# Patient Record
Sex: FEMALE | Race: BLACK OR AFRICAN AMERICAN | Employment: UNEMPLOYED | URBAN - METROPOLITAN AREA
[De-identification: names, ages, dates, MRNs, and addresses within clinical notes are randomized per-mention and may not be internally consistent; named-entity substitution may affect disease eponyms.]

---

## 2017-04-15 ENCOUNTER — HOSPITAL ENCOUNTER (INPATIENT)
Age: 62
LOS: 5 days | Discharge: HOME | DRG: 291 | End: 2017-04-20
Attending: EMERGENCY MEDICINE | Admitting: INTERNAL MEDICINE
Payer: MEDICARE

## 2017-04-15 ENCOUNTER — APPOINTMENT (OUTPATIENT)
Dept: GENERAL RADIOLOGY | Age: 62
DRG: 291 | End: 2017-04-15
Attending: EMERGENCY MEDICINE
Payer: MEDICARE

## 2017-04-15 DIAGNOSIS — I47.20 V-TACH: ICD-10-CM

## 2017-04-15 DIAGNOSIS — R06.03 RESPIRATORY DISTRESS: Primary | ICD-10-CM

## 2017-04-15 DIAGNOSIS — J81.0 ACUTE PULMONARY EDEMA (HCC): ICD-10-CM

## 2017-04-15 PROBLEM — J44.9 COPD (CHRONIC OBSTRUCTIVE PULMONARY DISEASE) (HCC): Status: ACTIVE | Noted: 2017-04-15

## 2017-04-15 PROBLEM — T46.2X1A: Status: ACTIVE | Noted: 2017-04-15

## 2017-04-15 PROBLEM — Z86.79 HISTORY OF AORTIC DISSECTION: Status: ACTIVE | Noted: 2017-04-15

## 2017-04-15 PROBLEM — R79.89 ELEVATED BRAIN NATRIURETIC PEPTIDE (BNP) LEVEL: Status: ACTIVE | Noted: 2017-04-15

## 2017-04-15 PROBLEM — E78.5 HLD (HYPERLIPIDEMIA): Status: ACTIVE | Noted: 2017-04-15

## 2017-04-15 LAB
ALBUMIN SERPL BCP-MCNC: 3.2 G/DL (ref 3.4–5)
ALBUMIN/GLOB SERPL: 0.8 {RATIO} (ref 0.8–1.7)
ALP SERPL-CCNC: 113 U/L (ref 45–117)
ALT SERPL-CCNC: 62 U/L (ref 13–56)
ANION GAP BLD CALC-SCNC: 17 MMOL/L (ref 3–18)
APPEARANCE UR: CLEAR
APTT PPP: 35.1 SEC (ref 23–36.4)
ARTERIAL PATENCY WRIST A: ABNORMAL
ARTERIAL PATENCY WRIST A: ABNORMAL
AST SERPL W P-5'-P-CCNC: 57 U/L (ref 15–37)
BACTERIA URNS QL MICRO: ABNORMAL /HPF
BASE DEFICIT BLD-SCNC: 13 MMOL/L
BASE DEFICIT BLD-SCNC: 6 MMOL/L
BASOPHILS # BLD AUTO: 0 K/UL (ref 0–0.1)
BASOPHILS # BLD: 0 % (ref 0–3)
BDY SITE: ABNORMAL
BDY SITE: ABNORMAL
BILIRUB SERPL-MCNC: 0.3 MG/DL (ref 0.2–1)
BILIRUB UR QL: NEGATIVE
BNP SERPL-MCNC: 3450 PG/ML (ref 0–900)
BODY TEMPERATURE: 37
BODY TEMPERATURE: 97.9
BUN SERPL-MCNC: 20 MG/DL (ref 7–18)
BUN/CREAT SERPL: 9 (ref 12–20)
CALCIUM SERPL-MCNC: 8.7 MG/DL (ref 8.5–10.1)
CHLORIDE SERPL-SCNC: 109 MMOL/L (ref 100–108)
CK MB CFR SERPL CALC: 0.9 % (ref 0–4)
CK MB CFR SERPL CALC: 2.8 % (ref 0–4)
CK MB SERPL-MCNC: 1.5 NG/ML (ref 5–25)
CK MB SERPL-MCNC: 5.6 NG/ML (ref 5–25)
CK SERPL-CCNC: 174 U/L (ref 26–192)
CK SERPL-CCNC: 201 U/L (ref 26–192)
CO2 SERPL-SCNC: 18 MMOL/L (ref 21–32)
COLOR UR: YELLOW
CREAT SERPL-MCNC: 2.3 MG/DL (ref 0.6–1.3)
DIFFERENTIAL METHOD BLD: ABNORMAL
EOSINOPHIL # BLD: 0.1 K/UL (ref 0–0.4)
EOSINOPHIL NFR BLD: 1 % (ref 0–5)
EPITH CASTS URNS QL MICRO: ABNORMAL /LPF (ref 0–5)
ERYTHROCYTE [DISTWIDTH] IN BLOOD BY AUTOMATED COUNT: 16.1 % (ref 11.6–14.5)
GAS FLOW.O2 O2 DELIVERY SYS: ABNORMAL L/MIN
GAS FLOW.O2 O2 DELIVERY SYS: ABNORMAL L/MIN
GLOBULIN SER CALC-MCNC: 4 G/DL (ref 2–4)
GLUCOSE BLD STRIP.AUTO-MCNC: 107 MG/DL (ref 70–110)
GLUCOSE SERPL-MCNC: 291 MG/DL (ref 74–99)
GLUCOSE UR STRIP.AUTO-MCNC: NEGATIVE MG/DL
HCO3 BLD-SCNC: 15.7 MMOL/L (ref 22–26)
HCO3 BLD-SCNC: 20 MMOL/L (ref 22–26)
HCT VFR BLD AUTO: 39.7 % (ref 35–45)
HGB BLD-MCNC: 12.8 G/DL (ref 12–16)
HGB UR QL STRIP: ABNORMAL
INR PPP: 2.4 (ref 0.8–1.2)
KETONES UR QL STRIP.AUTO: NEGATIVE MG/DL
LEUKOCYTE ESTERASE UR QL STRIP.AUTO: NEGATIVE
LYMPHOCYTES # BLD AUTO: 48 % (ref 20–51)
LYMPHOCYTES # BLD: 5 K/UL (ref 0.8–3.5)
MAGNESIUM SERPL-MCNC: 2.2 MG/DL (ref 1.6–2.6)
MCH RBC QN AUTO: 29.4 PG (ref 24–34)
MCHC RBC AUTO-ENTMCNC: 32.2 G/DL (ref 31–37)
MCV RBC AUTO: 91.3 FL (ref 74–97)
MONOCYTES # BLD: 0.8 K/UL (ref 0–1)
MONOCYTES NFR BLD AUTO: 8 % (ref 2–9)
NEUTS SEG # BLD: 4.4 K/UL (ref 1.8–8)
NEUTS SEG NFR BLD AUTO: 43 % (ref 42–75)
NITRITE UR QL STRIP.AUTO: NEGATIVE
O2/TOTAL GAS SETTING VFR VENT: 0.7 %
O2/TOTAL GAS SETTING VFR VENT: 1 %
PCO2 BLD: 37.3 MMHG (ref 35–45)
PCO2 BLD: 43.1 MMHG (ref 35–45)
PEEP RESPIRATORY: 5 CMH2O
PEEP RESPIRATORY: 5 CMH2O
PH BLD: 7.17 [PH] (ref 7.35–7.45)
PH BLD: 7.33 [PH] (ref 7.35–7.45)
PH UR STRIP: 5.5 [PH] (ref 5–8)
PIP ISTAT,IPIP: 12
PIP ISTAT,IPIP: 12
PLATELET # BLD AUTO: 213 K/UL (ref 135–420)
PMV BLD AUTO: 11.6 FL (ref 9.2–11.8)
PO2 BLD: 281 MMHG (ref 80–100)
PO2 BLD: 66 MMHG (ref 80–100)
POTASSIUM SERPL-SCNC: 4.9 MMOL/L (ref 3.5–5.5)
PRESSURE SUPPORT SETTING VENT: 7 CMH2O
PRESSURE SUPPORT SETTING VENT: 7 CMH2O
PROT SERPL-MCNC: 7.2 G/DL (ref 6.4–8.2)
PROT UR STRIP-MCNC: 100 MG/DL
PROTHROMBIN TIME: 25.2 SEC (ref 11.5–15.2)
RBC # BLD AUTO: 4.35 M/UL (ref 4.2–5.3)
RBC #/AREA URNS HPF: ABNORMAL /HPF (ref 0–5)
RBC MORPH BLD: ABNORMAL
RBC MORPH BLD: ABNORMAL
SAO2 % BLD: 100 % (ref 92–97)
SAO2 % BLD: 92 % (ref 92–97)
SERVICE CMNT-IMP: ABNORMAL
SERVICE CMNT-IMP: ABNORMAL
SODIUM SERPL-SCNC: 144 MMOL/L (ref 136–145)
SP GR UR REFRACTOMETRY: 1.01 (ref 1–1.03)
SPECIMEN TYPE: ABNORMAL
SPECIMEN TYPE: ABNORMAL
SPONTANEOUS TIMED, IST: YES
SPONTANEOUS TIMED, IST: YES
TOTAL RESP. RATE, ITRR: 17
TOTAL RESP. RATE, ITRR: 30
TROPONIN I SERPL-MCNC: 0.05 NG/ML (ref 0–0.06)
TROPONIN I SERPL-MCNC: 0.98 NG/ML (ref 0–0.06)
TSH SERPL DL<=0.05 MIU/L-ACNC: 1.17 UIU/ML (ref 0.36–3.74)
UROBILINOGEN UR QL STRIP.AUTO: 0.2 EU/DL (ref 0.2–1)
WBC # BLD AUTO: 10.3 K/UL (ref 4.6–13.2)
WBC MORPH BLD: ABNORMAL
WBC URNS QL MICRO: ABNORMAL /HPF (ref 0–5)

## 2017-04-15 PROCEDURE — 82803 BLOOD GASES ANY COMBINATION: CPT

## 2017-04-15 PROCEDURE — 74011250636 HC RX REV CODE- 250/636

## 2017-04-15 PROCEDURE — 82962 GLUCOSE BLOOD TEST: CPT

## 2017-04-15 PROCEDURE — 80053 COMPREHEN METABOLIC PANEL: CPT | Performed by: EMERGENCY MEDICINE

## 2017-04-15 PROCEDURE — 93306 TTE W/DOPPLER COMPLETE: CPT

## 2017-04-15 PROCEDURE — 96375 TX/PRO/DX INJ NEW DRUG ADDON: CPT

## 2017-04-15 PROCEDURE — 85730 THROMBOPLASTIN TIME PARTIAL: CPT | Performed by: EMERGENCY MEDICINE

## 2017-04-15 PROCEDURE — 74011250636 HC RX REV CODE- 250/636: Performed by: EMERGENCY MEDICINE

## 2017-04-15 PROCEDURE — 05HP33Z INSERTION OF INFUSION DEVICE INTO RIGHT EXTERNAL JUGULAR VEIN, PERCUTANEOUS APPROACH: ICD-10-PCS | Performed by: EMERGENCY MEDICINE

## 2017-04-15 PROCEDURE — 99285 EMERGENCY DEPT VISIT HI MDM: CPT

## 2017-04-15 PROCEDURE — 93005 ELECTROCARDIOGRAM TRACING: CPT

## 2017-04-15 PROCEDURE — 36415 COLL VENOUS BLD VENIPUNCTURE: CPT | Performed by: INTERNAL MEDICINE

## 2017-04-15 PROCEDURE — 85025 COMPLETE CBC W/AUTO DIFF WBC: CPT | Performed by: EMERGENCY MEDICINE

## 2017-04-15 PROCEDURE — 94660 CPAP INITIATION&MGMT: CPT

## 2017-04-15 PROCEDURE — 84443 ASSAY THYROID STIM HORMONE: CPT | Performed by: INTERNAL MEDICINE

## 2017-04-15 PROCEDURE — 92960 CARDIOVERSION ELECTRIC EXT: CPT

## 2017-04-15 PROCEDURE — 77030035694 HC MSK BIPAP FLL FAC PERFMAX PHIL -B

## 2017-04-15 PROCEDURE — 77010033678 HC OXYGEN DAILY

## 2017-04-15 PROCEDURE — 77030022643 HC PAD DEFIB AD HRTSTRT PHL -B

## 2017-04-15 PROCEDURE — 74011250637 HC RX REV CODE- 250/637: Performed by: INTERNAL MEDICINE

## 2017-04-15 PROCEDURE — 74011000250 HC RX REV CODE- 250: Performed by: EMERGENCY MEDICINE

## 2017-04-15 PROCEDURE — 74011250636 HC RX REV CODE- 250/636: Performed by: INTERNAL MEDICINE

## 2017-04-15 PROCEDURE — 36600 WITHDRAWAL OF ARTERIAL BLOOD: CPT

## 2017-04-15 PROCEDURE — 96365 THER/PROPH/DIAG IV INF INIT: CPT

## 2017-04-15 PROCEDURE — 65610000006 HC RM INTENSIVE CARE

## 2017-04-15 PROCEDURE — 83880 ASSAY OF NATRIURETIC PEPTIDE: CPT | Performed by: EMERGENCY MEDICINE

## 2017-04-15 PROCEDURE — 82550 ASSAY OF CK (CPK): CPT | Performed by: EMERGENCY MEDICINE

## 2017-04-15 PROCEDURE — 85610 PROTHROMBIN TIME: CPT | Performed by: EMERGENCY MEDICINE

## 2017-04-15 PROCEDURE — 77030011256 HC DRSG MEPILEX <16IN NO BORD MOLN -A

## 2017-04-15 PROCEDURE — 71010 XR CHEST PORT: CPT

## 2017-04-15 PROCEDURE — 5A2204Z RESTORATION OF CARDIAC RHYTHM, SINGLE: ICD-10-PCS | Performed by: EMERGENCY MEDICINE

## 2017-04-15 PROCEDURE — 81001 URINALYSIS AUTO W/SCOPE: CPT | Performed by: EMERGENCY MEDICINE

## 2017-04-15 PROCEDURE — 83735 ASSAY OF MAGNESIUM: CPT | Performed by: INTERNAL MEDICINE

## 2017-04-15 RX ORDER — SODIUM CHLORIDE 0.9 % (FLUSH) 0.9 %
5-10 SYRINGE (ML) INJECTION EVERY 8 HOURS
Status: DISCONTINUED | OUTPATIENT
Start: 2017-04-15 | End: 2017-04-20 | Stop reason: HOSPADM

## 2017-04-15 RX ORDER — FUROSEMIDE 10 MG/ML
60 INJECTION INTRAMUSCULAR; INTRAVENOUS ONCE
Status: COMPLETED | OUTPATIENT
Start: 2017-04-15 | End: 2017-04-15

## 2017-04-15 RX ORDER — LORAZEPAM 2 MG/ML
INJECTION INTRAMUSCULAR
Status: DISPENSED
Start: 2017-04-15 | End: 2017-04-15

## 2017-04-15 RX ORDER — FUROSEMIDE 10 MG/ML
60 INJECTION INTRAMUSCULAR; INTRAVENOUS
Status: COMPLETED | OUTPATIENT
Start: 2017-04-15 | End: 2017-04-15

## 2017-04-15 RX ORDER — SODIUM CHLORIDE 0.9 % (FLUSH) 0.9 %
5-10 SYRINGE (ML) INJECTION AS NEEDED
Status: DISCONTINUED | OUTPATIENT
Start: 2017-04-15 | End: 2017-04-20 | Stop reason: HOSPADM

## 2017-04-15 RX ORDER — FUROSEMIDE 10 MG/ML
INJECTION INTRAMUSCULAR; INTRAVENOUS
Status: DISPENSED
Start: 2017-04-15 | End: 2017-04-15

## 2017-04-15 RX ORDER — WARFARIN SODIUM 5 MG/1
5 TABLET ORAL ONCE
Status: COMPLETED | OUTPATIENT
Start: 2017-04-15 | End: 2017-04-15

## 2017-04-15 RX ORDER — LORAZEPAM 2 MG/ML
2 INJECTION INTRAMUSCULAR ONCE
Status: COMPLETED | OUTPATIENT
Start: 2017-04-15 | End: 2017-04-15

## 2017-04-15 RX ADMIN — LORAZEPAM 2 MG: 2 INJECTION INTRAMUSCULAR; INTRAVENOUS at 00:14

## 2017-04-15 RX ADMIN — FUROSEMIDE 60 MG: 10 INJECTION, SOLUTION INTRAMUSCULAR; INTRAVENOUS at 00:18

## 2017-04-15 RX ADMIN — Medication 10 ML: at 14:23

## 2017-04-15 RX ADMIN — AMIODARONE HYDROCHLORIDE 0.5 MG/MIN: 1.8 INJECTION, SOLUTION INTRAVENOUS at 06:57

## 2017-04-15 RX ADMIN — SODIUM CHLORIDE: 9 INJECTION INTRAMUSCULAR; INTRAVENOUS; SUBCUTANEOUS at 01:25

## 2017-04-15 RX ADMIN — Medication 10 ML: at 00:26

## 2017-04-15 RX ADMIN — FUROSEMIDE 60 MG: 10 INJECTION, SOLUTION INTRAMUSCULAR; INTRAVENOUS at 23:20

## 2017-04-15 RX ADMIN — AMIODARONE HYDROCHLORIDE 150 MG: 1.5 INJECTION, SOLUTION INTRAVENOUS at 00:28

## 2017-04-15 RX ADMIN — WARFARIN SODIUM 5 MG: 5 TABLET ORAL at 17:24

## 2017-04-15 RX ADMIN — Medication 10 ML: at 06:25

## 2017-04-15 RX ADMIN — AMIODARONE HYDROCHLORIDE 1 MG/MIN: 1.8 INJECTION, SOLUTION INTRAVENOUS at 00:42

## 2017-04-15 RX ADMIN — Medication 10 ML: at 23:20

## 2017-04-15 RX ADMIN — AMIODARONE HYDROCHLORIDE 0.5 MG/MIN: 1.8 INJECTION, SOLUTION INTRAVENOUS at 16:40

## 2017-04-15 NOTE — PROGRESS NOTES
PATIENT EXHIBITING INCREASED  AND MORE IRREGULAR. BP ALSO MORE ELEVATED. BIPAP WAS RESUMED @ 12/5 WITH FIO2 40%. NURSING AWARE.

## 2017-04-15 NOTE — PROGRESS NOTES
1930: Bedside and Verbal shift change report recieved from JOSHUA Malcolm RN. Report included the following information SBAR, Intake/Output, MAR, Recent Results, Cardiac Rhythm SR with BBB and Alarm Parameters . Verified IV amiodarone, see MAR.    1945: Assessment complete, see flow sheets. Pt resting in bed. Bipap in place. NAD. Nursing care continues. 5439-7237: BIPAP removed, NC applied at 4L for dinner. During removal, pt HR increased 110-120's AFIB after dinner BIPAP returned and pt NSR. Notified Dr. Deb Currie. 0000: Reassessment, no change. 0400: Reassessment, no change. 0715: Bedside and Verbal shift change report given to JOSE Stephenson RN (oncoming nurse) by Kacy Hill RN   (offgoing nurse). Report included the following information SBAR, Intake/Output, MAR, Cardiac Rhythm NSR, BBB and Alarm Parameters .

## 2017-04-15 NOTE — IP AVS SNAPSHOT
Summary of Care Report The Summary of Care report has been created to help improve care coordination. Users with access to [facility] or [address] (Web-based application) may access additional patient information including the Discharge Summary. If you are not currently a [address] user and need more information, please call the number listed below in the [place] [address] section and ask to be connected with Medical Records. Facility Information Name Address Phone [address] [place] [id number] [phone] Patient Information Patient Name Sex  Sherri Sanchez ([id number]) Female 1955 Discharge Information Admitting Provider Service Area Unit Evelia Vasquez MD / [address] [place] [address]/Med / [phone] Discharge Provider Discharge Date/Time Discharge Disposition Destination (none) 2017 (Pending) AHR (none) Patient Language Language ENGLISH [13] Hospital Problems as of 2017  Reviewed: 4/15/2017  2:02 AM by Evelia Vasquez MD  
  
  
  
 Class Noted - Resolved Last Modified POA Active Problems * (Principal)Acute on chronic systolic congestive heart failure ([address]  2014 - Present 2017 by Michelle Jimenez, DO Yes Entered by Eugenie Carver MD  
  Overview Signed 2014  1:45 PM by Heaven Restrepo MD  
   EF 25%. Hypertension, essential  10/1/2015 - Present 4/15/2017 by Evelia Vasquez MD Yes Entered by Giovanny Fierro MD  
  Atrial fibrillation with RVR ([address]  2016 - Present 4/15/2017 by Evelia Vasquez MD Yes Entered by Giovanny Fierro MD  
  COPD (chronic obstructive pulmonary disease) ([address]  4/15/2017 - Present 2017 by Michelle Jimenez DO Yes   Entered by Evelia Vasquez MD  
  History of aortic dissection  4/15/2017 - Present 2017 by Jen Cam KMI Batista, DO Yes Entered by Murray Chin MD  
  HLD (hyperlipidemia)  4/15/2017 - Present 4/19/2017 by Adi Perez, DO Yes Entered by Murray Chin MD  
  Elevated brain natriuretic peptide (BNP) level  4/15/2017 - Present 4/19/2017 by Adi Perez, DO Yes Entered by Murray Chin MD  
  
Non-Hospital Problems as of 4/20/2017  Reviewed: 4/15/2017  2:02 AM by Murray Chin MD  
  
  
  
 Class Noted - Resolved Last Modified Active Problems Pneumonia, organism unspecified  2/19/2014 - Present 9/29/2014 Entered by Andrade Zarco MD  
  Respiratory failure, acute ([facility]  9/24/2014 - Present 9/29/2014 Entered by Deon Rodriguez MD  
  PAF (paroxysmal atrial fibrillation) ([facility]  9/27/2014 - Present 9/29/2014 Entered by Martín Rodgers MD  
  Acute on chronic renal failure ([facility]  9/27/2014 - Present 6/9/2016 by Leonid Currie MD  
  Entered by Martín Rodgers MD  
  Pneumonia  9/27/2014 - Present 9/29/2014 Entered by Martín Rodgers MD  
  New onset a-[facility])  9/30/2015 - Present 9/30/2015 by Leonid Currie MD  
  Entered by Gretchen Davies MD  
  CKD (chronic kidney disease) stage 3, GFR 30-59 ml/min  10/1/2015 - Present 10/1/2015 by Marianna Cabrera MD  
  Entered by Marianna Cabrera MD  
  Systolic CHF, acute [facility])  10/1/2015 - Present 6/9/2016 by Leonid Currie MD  
  Entered by Marianna Cabrera MD  
  A-[facility])  6/9/2016 - Present 6/9/2016 by Leonid Currie MD  
  Entered by Leonid Currie MD  
  Dehydration  6/9/2016 - Present 6/9/2016 by Leonid Currie MD  
  Entered by Leonid Currie MD  
  
You are allergic to the following Allergen Reactions Pcn (Penicillins) Unknown (comments) Current Discharge Medication List  
  
START taking these medications Dose & Instructions Dispensing Information Comments  
 amiodarone 200 mg tablet Commonly known as:  CORDARONE Dose:  200 mg Take 1 Tab by mouth daily. Quantity:  30 Tab Refills:  2 losartan 25 mg tablet Commonly known as:  COZAAR Dose:  25 mg Take 1 Tab by mouth daily. Quantity:  30 Tab Refills:  2  
   
 metoprolol succinate 25 mg XL tablet Commonly known as:  TOPROL-XL Dose:  25 mg Take 1 Tab by mouth daily. Quantity:  30 Tab Refills:  2 CONTINUE these medications which have NOT CHANGED Dose & Instructions Dispensing Information Comments  
 furosemide 20 mg tablet Commonly known as:  LASIX Dose:  20 mg Take 1 Tab by mouth daily. Quantity:  30 Tab Refills:  1  
   
 simvastatin 40 mg tablet Commonly known as:  ZOCOR Dose:  40 mg Take 40 mg by mouth nightly. Refills:  0  
   
 spironolactone 25 mg tablet Commonly known as:  ALDACTONE Dose:  25 mg Take 1 Tab by mouth daily. Quantity:  30 Tab Refills:  1  
   
 umeclidinium-vilanterol 62.5-25 mcg/actuation inhaler Commonly known as:  Bhavani Chard Dose:  1 Puff Take 1 Puff by inhalation daily. Refills:  0  
   
 warfarin 5 mg tablet Commonly known as:  COUMADIN Dose:  5 mg Take 1 Tab by mouth every evening. Quantity:  15 Tab Refills:  0 STOP taking these medications Comments  
 carvedilol 25 mg tablet Commonly known as:  COREG  
   
   
 dilTIAZem  mg ER capsule Commonly known as:  CARDIZEM CD  
   
   
 valsartan 160 mg tablet Commonly known as:  DIOVAN Current Immunizations Name Date Influenza Vaccine (Quad) PF 10/2/2015 Pneumococcal Polysaccharide (PPSV-23) 2/25/2014 Follow-up Information Follow up With Details Comments Contact Info Provider Unknown   Patient not available to ask Discharge Instructions Chemical Cardioversion: Care Instructions Your Care Instructions Cardioversion resets your heart's rhythm to its normal pattern. It treats heart rhythm problems like atrial fibrillation or supraventricular tachycardia. Chemical cardioversion uses rhythm-control medicines to reset your heart. They can also help keep your heart in a normal rhythm after it has been reset. You may take this medicine as pills. Or you may get it in your arm through a tube called an IV. If you have an IV, it will be done in the hospital. If you use the pills, you might start them in the hospital. Or you might start the pills at home. Your doctor may ask you to take other medicines before your cardioversion. They can help keep blood clots from forming. And they can prevent the heart-rate problem from coming back. Sometimes the heart rate doesn't go back to normal. Or it may reset for a while and then go back to an uneven rate. If this happens, you may need electrical cardioversion. Follow-up care is a key part of your treatment and safety. Be sure to make and go to all appointments, and call your doctor if you are having problems. It's also a good idea to know your test results and keep a list of the medicines you take. How can you care for yourself at home? · Talk to your doctor about the benefits and risks of these medicines. They might cause serious side effects. Your doctor will want to see you often. Be sure to go to all of your doctor visits. · Take your medicines exactly as prescribed. Call your doctor if you think you are having a problem with your medicine. · Check with your doctor or pharmacist before you use any other medicines. This includes over-the-counter medicines. Make sure your doctor knows all of the medicines, vitamins, herbal products, and supplements you take. Taking some medicines together can cause problems. When should you call for help? Call 911 anytime you think you may need emergency care. For example, call if: 
· You passed out (lost consciousness). Call your doctor now or seek immediate medical care if: 
· You are dizzy or lightheaded, or you feel like you may faint. · You have a fast or irregular heartbeat. Watch closely for changes in your health, and be sure to contact your doctor if: 
· You are not getting better as expected. · You think you are having side effects from your medicine. Where can you learn more? Go to http://vicki-chandrika.info/. Enter X696 in the search box to learn more about \"Chemical Cardioversion: Care Instructions. \" Current as of: January 27, 2016 Content Version: 11.2 © 1158-7090 [employer] Security [employer]. Care instructions adapted under license by [employer] (which disclaims liability or warranty for this information). If you have questions about a medical condition or this instruction, always ask your healthcare professional. [place] [address] any warranty or liability for your use of this information. Pulmonary Edema: Care Instructions Your Care Instructions Pulmonary edema is the buildup of fluid in the lungs. It usually occurs when the heart does not pump blood through the body properly. Pulmonary edema can also be caused by another disease, such as liver or kidney failure. It can also happen at high altitudes, from a poisoning, or as a result of a near-drowning. If you have fluid in your lungs, you may have trouble breathing, be restless, have a fast heart rate, or cough up foamy pink fluid. Breathing problems may be worse when you lie down. Follow-up care is a key part of your treatment and safety. Be sure to make and go to all appointments, and call your doctor if you are having problems. It's also a good idea to know your test results and keep a list of the medicines you take. How can you care for yourself at home? Medicines · Take your medicines exactly as prescribed. Call your doctor if you think you are having a problem with your medicine. · Review all of your regular medicines with your doctor. Do not take any vitamins, over-the-counter medicines, or herbal products without talking to your doctor first. 
Diet · Eat a balanced diet. Make an appointment with a dietitian if you have questions about what type of diet might be best for you. · Do not eat more than 2,000 milligrams (mg) of sodium each day. That is less than 1 teaspoon of salt a day, including all the salt you eat in prepared or packaged foods. ¨ Do not add salt while you are cooking or at the table. Flavor with garlic, lemon juice, onion, vinegar, herbs, and spices instead of salt. ¨ Eat fewer processed foods and foods from restaurants, including fast food. ¨ Use fresh or frozen foods instead of canned. ¨ Count and record how much sodium you eat each day. Check food labels for sodium. ¨ Ask your doctor before using salt substitutes that have potassium, such as Lite Salt. Lifestyle · Stay out of air pollution; smog; cold, dry air; hot, humid air; and high altitudes. · Learn breathing methods that help the airflow in and out of your lungs. · Take rest breaks often. Schedule short rest breaks when doing housework and other activities. An occupational or physical therapist can help you find ways to do everyday activities with less effort. · Start light exercise if your doctor says it is okay. Try to stay as active as possible. If you have not exercised in the past, start out slowly. Walking is a good way to start. · Get enough rest at night. Sleeping with 1 or 2 pillows under your upper body and head may help you breathe easier at night. · Discuss rehabilitation with your doctor. Find out what programs are available in your area. · Do not smoke or use other tobacco products. Smoking can make your condition worse. If you need help quitting, talk to your doctor about stop-smoking programs and medicines. These can increase your chances of quitting for good. · Do not use alcohol or illegal drugs. When should you call for help? Call 911 anytime you think you may need emergency care. For example, call if: 
· You have severe trouble breathing. · You passed out (lost consciousness). · You have symptoms of a heart attack. These may include: ¨ Chest pain or pressure, or a strange feeling in the chest. 
¨ Sweating. ¨ Shortness of breath. ¨ Nausea or vomiting. ¨ Pain, pressure, or a strange feeling in the back, neck, jaw, or upper belly or in one or both shoulders or arms. ¨ Lightheadedness or sudden weakness. ¨ A fast or irregular heartbeat. Pain that spreads from the chest to the neck, jaw, or one or both shoulders or arms. After you call 911, the  may tell you to chew 1 adult-strength or 2 to 4 low-dose aspirin. Wait for an ambulance. Do not try to drive yourself. Call your doctor now or seek immediate medical care if: 
· You have trouble breathing or have wheezing that is getting worse. · You are coughing more deeply or more often. · You cough up blood. · You get a fever. · You have more swelling in your legs or belly. · Your symptoms are getting worse. Watch closely for changes in your health, and be sure to contact your doctor if you have any problems. Where can you learn more? Go to http://vicki-chandrika.info/. Enter D848 in the search box to learn more about \"Pulmonary Edema: Care Instructions. \" Current as of: May 23, 2016 Content Version: 11.2 © 4658-1256 [employer]. Care instructions adapted under license by [employer] (which disclaims liability or warranty for this information). If you have questions about a medical condition or this instruction, always ask your healthcare professional. [place] [address] any warranty or liability for your use of this information. Patient armband removed and shredded DISCHARGE SUMMARY from Nurse The following personal items are in your possession at time of discharge: 
 
Dental Appliances: None Home Medications: None Jewelry: None Clothing: With patient Other Valuables: None Personal Items Sent to Safe: no 
 
 
 PATIENT INSTRUCTIONS: 
 
 
F-face looks uneven A-arms unable to move or move unevenly S-speech slurred or non-existent T-time-call 911 as soon as signs and symptoms begin-DO NOT go Back to bed or wait to see if you get better-TIME IS BRAIN. Warning Signs of HEART ATTACK Call 911 if you have these symptoms: 
? Chest discomfort. Most heart attacks involve discomfort in the center of the chest that lasts more than a few minutes, or that goes away and comes back. It can feel like uncomfortable pressure, squeezing, fullness, or pain. ? Discomfort in other areas of the upper body. Symptoms can include pain or discomfort in one or both arms, the back, neck, jaw, or stomach. ? Shortness of breath with or without chest discomfort. ? Other signs may include breaking out in a cold sweat, nausea, or lightheadedness. Don't wait more than five minutes to call [address]! Fast action can save your life. Calling 911 is almost always the fastest way to get lifesaving treatment. Emergency Medical Services staff can begin treatment when they arrive  up to an hour sooner than if someone gets to the hospital by car. The discharge information has been reviewed with the patient. The patient verbalized understanding. Discharge medications reviewed with the patient and appropriate educational materials and side effects teaching were provided. Chart Review Routing History Recipient Method Report Sent By Nico Winter MD  
Phone: [phone] In [employer] Routed JUNIOR Valle [[id number]] 2/21/2014  5:17 PM 02/21/2014 Sterling Foley MD  
Phone: [phone] In [employer] Routed JUNIOR Valle [[id number]] 2/21/2014  5:17 PM 02/21/2014 Reed Martinez MD  
[address] [place] [address] 110 52 [facility] [address] [employer] Phone: [phone] Mail IP Auto Routed Fariha Leo [[id number]] 2/21/2014  5:17 PM 02/21/2014 Ara Blank MD  
Phone: [phone] In [employer] Routed Karolina Dupree MD [[id number]] 2/25/2014  4:18 PM 02/25/2014 Sterling Foley MD  
Phone: [phone] In [employer] Routed Karolina Dupree MD [[id number]] 2/25/2014  4:18 PM 02/25/2014 Sterling Foley MD  
Phone: [phone] In Basket IP Auto Routed [address] [[id number]] 10/9/2014 12:17 PM 10/09/2014 Masood Jeronimo DO Fax: [phone] Phone: [phone] Fax IP Auto Routed [address] [[id number]] 10/9/2014 12:17 PM 10/09/2014 Fidel Winter MD  
Phone: [phone] In Basket IP Auto Routed [address] [[id number]] 10/9/2014 12:17 PM 10/09/2014 Hetal Castellano MD  
Fax: [phone] Phone: [phone] Fax IP Auto Routed [address] [[id number]] 10/9/2014 12:17 PM 10/09/2014 Delvin Dave MD  
[id number] [address] [place] [facility] [address] [place] [address] [address] [employer] Phone: [phone] Mail IP Auto Routed [address] [[id number]] 10/9/2014 12:17 PM 10/09/2014 Penelope Paul MD  
[id number] [address] [facility] [address] [facility] [address] Phone: [phone] Mail IP Auto Routed Judy Chow [[id number]] 10/1/2015 10:08 AM 10/01/2015 Day Murray MD  
Phone: [phone] In Basket IP Auto Routed Judy Chow [[id number]] 10/1/2015 10:08 AM 10/01/2015 Fidel Winter MD  
Phone: [phone] In [employer] IP Auto Routed Karolina Dupree MD [[id number]] 10/6/2015  2:25 PM 10/06/2015 Penelope Paul MD  
[id number] [address] [facility] [address] Jeffery Roman [address] Phone: [phone] Mail IP Auto Routed Maria L Saucedo MD [[id number]] 10/6/2015  2:25 PM 10/06/2015 Mir James MD  
Phone: [phone] In [employer] IP Auto Routed Maria L Saucedo MD [[id number]] 10/6/2015  2:25 PM 10/06/2015 Sherman Flores MD  
Phone: [phone] In [employer] IP Auto Routed Maria L Saucedo MD [[id number]] 10/6/2015  2:25 PM 10/06/2015 Rosalva Shah MD  
Fax: [phone] Phone: [phone] Fax IP Auto Routed Maria L Saucedo MD [[id number]] 6/12/2016  3:30 PM 06/12/2016 Mir James MD  
Phone: [phone] In [employer] Routed Maria L Saucedo MD [[id number]] 6/12/2016  3:30 PM 06/12/2016 Calixto Lewis MD  
Fax: [phone] Phone: [phone] Fax IP Auto Routed Maria L Saucedo MD [[id number]] 6/12/2016  3:30 PM 06/12/2016

## 2017-04-15 NOTE — IP AVS SNAPSHOT
[address] [place] [id number] 
[phone] Patient: Ashley Jett MRN: [id number] BVB:[id number] You are allergic to the following Allergen Reactions Pcn (Penicillins) Unknown (comments) Recent Documentation Height Weight BMI OB Status Smoking Status 1.651 m 66.9 kg 24.54 kg/m2 Postmenopausal Former Smoker Emergency Contacts Name Discharge Info Relation Home Work Mobile Cornel Rivas DISCHARGE CAREGIVER [3] Boyfriend [17]   [phone] About your hospitalization You were admitted on:  April 15, 2017 You last received care in the:  [address] [place] [address] You were discharged on:  April 20, 2017 Unit phone number:  [phone] Why you were hospitalized Your primary diagnosis was:  Acute On Chronic Systolic Congestive Heart Failure (Hcc) Your diagnoses also included:  Acute Pulmonary Edema (Hcc), V-Tach (Hcc), Respiratory Distress, Acute Respiratory Failure (Hcc), Hypertension, Essential, Atrial Fibrillation With Rvr (Hcc), Copd (Chronic Obstructive Pulmonary Disease) (Hcc), History Of Aortic Dissection, Hld (Hyperlipidemia), Elevated Brain Natriuretic Peptide (Bnp) Level Providers Seen During Your Hospitalizations Provider Role Specialty Primary office phone Gretchen Davies MD Attending Provider Family Practice [phone] Murray Chin MD Attending Provider Internal Medicine [phone] Your Primary Care Physician (PCP) Primary Care Physician Office Phone Office Fax UNKNOWN, PROVIDER ** None ** ** None ** Follow-up Information Follow up With Details Comments Contact Info Provider Unknown   Patient not available to ask Current Discharge Medication List  
  
START taking these medications Dose & Instructions Dispensing Information Comments Morning Noon Evening Bedtime amiodarone 200 mg tablet Commonly known as:  CORDARONE Your last dose was: Your next dose is:    
   
   
 Dose:  200 mg Take 1 Tab by mouth daily. Quantity:  30 Tab Refills:  2  
     
   
   
   
  
 losartan 25 mg tablet Commonly known as:  COZAAR Your last dose was: Your next dose is:    
   
   
 Dose:  25 mg Take 1 Tab by mouth daily. Quantity:  30 Tab Refills:  2  
     
   
   
   
  
 metoprolol succinate 25 mg XL tablet Commonly known as:  TOPROL-XL Your last dose was: Your next dose is:    
   
   
 Dose:  25 mg Take 1 Tab by mouth daily. Quantity:  30 Tab Refills:  2 CONTINUE these medications which have NOT CHANGED Dose & Instructions Dispensing Information Comments Morning Noon Evening Bedtime  
 furosemide 20 mg tablet Commonly known as:  LASIX Your last dose was: Your next dose is:    
   
   
 Dose:  20 mg Take 1 Tab by mouth daily. Quantity:  30 Tab Refills:  1  
     
   
   
   
  
 simvastatin 40 mg tablet Commonly known as:  ZOCOR Your last dose was: Your next dose is:    
   
   
 Dose:  40 mg Take 40 mg by mouth nightly. Refills:  0  
     
   
   
   
  
 spironolactone 25 mg tablet Commonly known as:  ALDACTONE Your last dose was: Your next dose is:    
   
   
 Dose:  25 mg Take 1 Tab by mouth daily. Quantity:  30 Tab Refills:  1  
     
   
   
   
  
 umeclidinium-vilanterol 62.5-25 mcg/actuation inhaler Commonly known as:  Eletha Merry Your last dose was: Your next dose is:    
   
   
 Dose:  1 Puff Take 1 Puff by inhalation daily. Refills:  0  
     
   
   
   
  
 warfarin 5 mg tablet Commonly known as:  COUMADIN Your last dose was: Your next dose is:    
   
   
 Dose:  5 mg Take 1 Tab by mouth every evening. Quantity:  15 Tab Refills:  0 STOP taking these medications   
 carvedilol 25 mg tablet Commonly known as:  COREG  
   
  
 dilTIAZem  mg ER capsule Commonly known as:  CARDIZEM CD  
   
  
 valsartan 160 mg tablet Commonly known as:  DIOVAN Where to Get Your Medications Information on where to get these meds will be given to you by the nurse or doctor. ! Ask your nurse or doctor about these medications  
  amiodarone 200 mg tablet  
 losartan 25 mg tablet  
 metoprolol succinate 25 mg XL tablet Discharge Instructions Chemical Cardioversion: Care Instructions Your Care Instructions Cardioversion resets your heart's rhythm to its normal pattern. It treats heart rhythm problems like atrial fibrillation or supraventricular tachycardia. Chemical cardioversion uses rhythm-control medicines to reset your heart. They can also help keep your heart in a normal rhythm after it has been reset. You may take this medicine as pills. Or you may get it in your arm through a tube called an IV. If you have an IV, it will be done in the hospital. If you use the pills, you might start them in the hospital. Or you might start the pills at home. Your doctor may ask you to take other medicines before your cardioversion. They can help keep blood clots from forming. And they can prevent the heart-rate problem from coming back. Sometimes the heart rate doesn't go back to normal. Or it may reset for a while and then go back to an uneven rate. If this happens, you may need electrical cardioversion. Follow-up care is a key part of your treatment and safety. Be sure to make and go to all appointments, and call your doctor if you are having problems. It's also a good idea to know your test results and keep a list of the medicines you take. How can you care for yourself at home? · Talk to your doctor about the benefits and risks of these medicines. They might cause serious side effects. Your doctor will want to see you often. Be sure to go to all of your doctor visits. · Take your medicines exactly as prescribed. Call your doctor if you think you are having a problem with your medicine. · Check with your doctor or pharmacist before you use any other medicines. This includes over-the-counter medicines. Make sure your doctor knows all of the medicines, vitamins, herbal products, and supplements you take. Taking some medicines together can cause problems. When should you call for help? Call 911 anytime you think you may need emergency care. For example, call if: 
· You passed out (lost consciousness). Call your doctor now or seek immediate medical care if: 
· You are dizzy or lightheaded, or you feel like you may faint. · You have a fast or irregular heartbeat. Watch closely for changes in your health, and be sure to contact your doctor if: 
· You are not getting better as expected. · You think you are having side effects from your medicine. Where can you learn more? Go to http://vicki-chandrika.info/. Enter L198 in the search box to learn more about \"Chemical Cardioversion: Care Instructions. \" Current as of: January 27, 2016 Content Version: 11.2 © 0269-1876 [employer]. Care instructions adapted under license by [employer] (which disclaims liability or warranty for this information). If you have questions about a medical condition or this instruction, always ask your healthcare professional. [place] [address] any warranty or liability for your use of this information. Pulmonary Edema: Care Instructions Your Care Instructions Pulmonary edema is the buildup of fluid in the lungs. It usually occurs when the heart does not pump blood through the body properly.  Pulmonary edema can also be caused by another disease, such as liver or kidney failure. It can also happen at high altitudes, from a poisoning, or as a result of a near-drowning. If you have fluid in your lungs, you may have trouble breathing, be restless, have a fast heart rate, or cough up foamy pink fluid. Breathing problems may be worse when you lie down. Follow-up care is a key part of your treatment and safety. Be sure to make and go to all appointments, and call your doctor if you are having problems. It's also a good idea to know your test results and keep a list of the medicines you take. How can you care for yourself at home? Medicines · Take your medicines exactly as prescribed. Call your doctor if you think you are having a problem with your medicine. · Review all of your regular medicines with your doctor. Do not take any vitamins, over-the-counter medicines, or herbal products without talking to your doctor first. 
Diet · Eat a balanced diet. Make an appointment with a dietitian if you have questions about what type of diet might be best for you. · Do not eat more than 2,000 milligrams (mg) of sodium each day. That is less than 1 teaspoon of salt a day, including all the salt you eat in prepared or packaged foods. ¨ Do not add salt while you are cooking or at the table. Flavor with garlic, lemon juice, onion, vinegar, herbs, and spices instead of salt. ¨ Eat fewer processed foods and foods from restaurants, including fast food. ¨ Use fresh or frozen foods instead of canned. ¨ Count and record how much sodium you eat each day. Check food labels for sodium. ¨ Ask your doctor before using salt substitutes that have potassium, such as Lite Salt. Lifestyle · Stay out of air pollution; smog; cold, dry air; hot, humid air; and high altitudes. · Learn breathing methods that help the airflow in and out of your lungs. · Take rest breaks often. Schedule short rest breaks when doing housework and other activities.  An occupational or physical therapist can help you find ways to do everyday activities with less effort. · Start light exercise if your doctor says it is okay. Try to stay as active as possible. If you have not exercised in the past, start out slowly. Walking is a good way to start. · Get enough rest at night. Sleeping with 1 or 2 pillows under your upper body and head may help you breathe easier at night. · Discuss rehabilitation with your doctor. Find out what programs are available in your area. · Do not smoke or use other tobacco products. Smoking can make your condition worse. If you need help quitting, talk to your doctor about stop-smoking programs and medicines. These can increase your chances of quitting for good. · Do not use alcohol or illegal drugs. When should you call for help? Call 911 anytime you think you may need emergency care. For example, call if: 
· You have severe trouble breathing. · You passed out (lost consciousness). · You have symptoms of a heart attack. These may include: ¨ Chest pain or pressure, or a strange feeling in the chest. 
¨ Sweating. ¨ Shortness of breath. ¨ Nausea or vomiting. ¨ Pain, pressure, or a strange feeling in the back, neck, jaw, or upper belly or in one or both shoulders or arms. ¨ Lightheadedness or sudden weakness. ¨ A fast or irregular heartbeat. Pain that spreads from the chest to the neck, jaw, or one or both shoulders or arms. After you call 911, the  may tell you to chew 1 adult-strength or 2 to 4 low-dose aspirin. Wait for an ambulance. Do not try to drive yourself. Call your doctor now or seek immediate medical care if: 
· You have trouble breathing or have wheezing that is getting worse. · You are coughing more deeply or more often. · You cough up blood. · You get a fever. · You have more swelling in your legs or belly. · Your symptoms are getting worse. Watch closely for changes in your health, and be sure to contact your doctor if you have any problems. Where can you learn more? Go to http://vicki-chandrika.info/. Enter X964 in the search box to learn more about \"Pulmonary Edema: Care Instructions. \" Current as of: May 23, 2016 Content Version: 11.2 © 0022-9133 [employer]. Care instructions adapted under license by [employer] (which disclaims liability or warranty for this information). If you have questions about a medical condition or this instruction, always ask your healthcare professional. [place] [address] any warranty or liability for your use of this information. Patient armband removed and shredded DISCHARGE SUMMARY from Nurse The following personal items are in your possession at time of discharge: 
 
Dental Appliances: None Home Medications: None Jewelry: None Clothing: With patient Other Valuables: None Personal Items Sent to Safe: no PATIENT INSTRUCTIONS: 
 
After general anesthesia or intravenous sedation, for 24 hours or while taking prescription Narcotics: · Limit your activities · Do not drive and operate hazardous machinery · Do not make important personal or business decisions · Do  not drink alcoholic beverages · If you have not urinated within 8 hours after discharge, please contact your surgeon on call. Report the following to your surgeon: 
· Excessive pain, swelling, redness or odor of or around the surgical area · Temperature over 100.5 · Nausea and vomiting lasting longer than 4 hours or if unable to take medications · Any signs of decreased circulation or nerve impairment to extremity: change in color, persistent  numbness, tingling, coldness or increase pain · Any questions What to do at Home: 
Recommended activity: Activity as tolerated *  Please give a list of your current medications to your Primary Care Provider.  
 
*  Please update this list whenever your medications are discontinued, doses are 
 changed, or new medications (including over-the-counter products) are added. *  Please carry medication information at all times in case of emergency situations. These are general instructions for a healthy lifestyle: No smoking/ No tobacco products/ Avoid exposure to second hand smoke Surgeon General's Warning:  Quitting smoking now greatly reduces serious risk to your health. Obesity, smoking, and sedentary lifestyle greatly increases your risk for illness A healthy diet, regular physical exercise & weight monitoring are important for maintaining a healthy lifestyle You may be retaining fluid if you have a history of heart failure or if you experience any of the following symptoms:  Weight gain of 3 pounds or more overnight or 5 pounds in a week, increased swelling in our hands or feet or shortness of breath while lying flat in bed. Please call your doctor as soon as you notice any of these symptoms; do not wait until your next office visit. Recognize signs and symptoms of STROKE: 
 
F-face looks uneven A-arms unable to move or move unevenly S-speech slurred or non-existent T-time-call 911 as soon as signs and symptoms begin-DO NOT go Back to bed or wait to see if you get better-TIME IS BRAIN. Warning Signs of HEART ATTACK Call 911 if you have these symptoms: 
? Chest discomfort. Most heart attacks involve discomfort in the center of the chest that lasts more than a few minutes, or that goes away and comes back. It can feel like uncomfortable pressure, squeezing, fullness, or pain. ? Discomfort in other areas of the upper body. Symptoms can include pain or discomfort in one or both arms, the back, neck, jaw, or stomach. ? Shortness of breath with or without chest discomfort. ? Other signs may include breaking out in a cold sweat, nausea, or lightheadedness. Don't wait more than five minutes to call [address]!  Fast action can save your life. Calling 911 is almost always the fastest way to get lifesaving treatment. Emergency Medical Services staff can begin treatment when they arrive  up to an hour sooner than if someone gets to the hospital by car. The discharge information has been reviewed with the patient. The patient verbalized understanding. Discharge medications reviewed with the patient and appropriate educational materials and side effects teaching were provided. Discharge Orders None [facility] Announcement We are excited to announce that we are making your provider's discharge notes available to you in [employer]. You will see these notes when they are completed and signed by the physician that discharged you from your recent hospital stay. If you have any questions or concerns about any information you see in [employer]APPmedia, please call the Health Information Department where you were seen or reach out to your Primary Care Provider for more information about your plan of care. Introducing [facility]! Anu Hernandez introduces XAPPmedia patient portal. Now you can access parts of your medical record, email your doctor's office, and request medication refills online. 1. In your internet browser, go to [email or web address] 2. Click on the First Time User? Click Here link in the Sign In box. You will see the New Member Sign Up page. 3. Enter your [employer] Access Code exactly as it appears below. You will not need to use this code after youve completed the sign-up process. If you do not sign up before the expiration date, you must request a new code. · [employer] Access Code: [email or web address] Expires: 7/14/2017 12:11 AM 
 
4. Enter the last four digits of your Social Security Number (xxxx) and Date of Birth (mm/dd/yyyy) as indicated and click Submit. You will be taken to the next sign-up page. 5. Create a [employer] ID. This will be your [employer] login ID and cannot be changed, so think of one that is secure and easy to remember. 6. Create a [employer] password. You can change your password at any time. 7. Enter your Password Reset Question and Answer. This can be used at a later time if you forget your password. 8. Enter your e-mail address. You will receive e-mail notification when new information is available in [address] 9. Click Sign Up. You can now view and download portions of your medical record. 10. Click the Download Summary menu link to download a portable copy of your medical information. If you have questions, please visit the Frequently Asked Questions section of the [employer] website. Remember, [employer] is NOT to be used for urgent needs. For medical emergencies, dial 911. Now available from your iPhone and Android! General Information Please provide this summary of care documentation to your next provider. Patient Signature:  ____________________________________________________________ Date:  ____________________________________________________________  
  
Jamison End Provider Signature:  ____________________________________________________________ Date:  ____________________________________________________________

## 2017-04-15 NOTE — PROGRESS NOTES
Pharmacy Dosing Services: Warfarin    Consult for Warfarin Dosing by Pharmacy by Dr. Jethro Brown provided for this 64 y.o.  female , for indication of Atrial Fibrillation. Day of Therapy (continuation from home regimen)  Dose to achieve an INR goal of 2-3    Order entered for  Warfarin  5 mg to be given today at 18:00. Significant drug interactions: Amiodarone    LABS    PT/INR Lab Results   Component Value Date/Time    INR 2.4 04/15/2017 12:20 AM      Platelets Lab Results   Component Value Date/Time    PLATELET 182 13/42/5651 12:20 AM      H/H     Lab Results   Component Value Date/Time    HGB 12.8 04/15/2017 12:20 AM        Warfarin Administrations (last 168 hours)       None          Pharmacy to follow daily and will provide subsequent Warfarin dosing based on clinical status. Ludy Santa PharmThom D.   Clinical Pharmacist  [phone]

## 2017-04-15 NOTE — PROGRESS NOTES
[facility] Nursing Progress Note    Data:  assumed care of patient at 0730  Action:  Patient assessed, no distress noted VSS, SR with BBB  Response:  Patient alert oriented x 4 with no present complaints, amio 0.5 mg infusing through right EG 18G IV site with no swelling or bruising, ice pack noted to RUE to infiltration site arm with mild edema otherwise intact,  Plan:  Wean Bipap as appropriate

## 2017-04-15 NOTE — PROGRESS NOTES
PATIENT AWAKE AND ALERT. REMOVED FROM BIPAP AT THIS TIME AND PLACED ON 3L NC.  SPO2 97% ON SAME. RR 17. PATIENT IS COMFORTABLE AND IN NO DISTRESS. BIPAP LEFT ON STANDBY.

## 2017-04-15 NOTE — PROGRESS NOTES
Patient resting comfortably, BIPAP settings decreased per RT patient SPO2 97% no acute changes or distress noted, will continue to monitor.

## 2017-04-15 NOTE — ED PROVIDER NOTES
HPI Comments: 12:04 AM  Ashley Jett is a 64 y.o. female With hx of AAA, CHF, and A-fib with RVR presenting to the ED c/o sudden onset of respiratory distress PTA. Pt was playing bingo when she suddenly felt hot. Per EMS, pt was able to hold a conversation about 7 minutes ago, but pt's pulse rate gradually elevated en-route and she is now in severe respiratory distress. Per EMS, pt is diaphoretic and it was difficult to obtain an EKG. Per medical records, the patient is on Coumadin for her A-fib. Cardiologist is Dr. Marjorie Forrester of [facility]. Hx limited by patient's respiratory distress. Patient is a 64 y.o. female presenting with respiratory distress syndrome. The history is provided by the EMS personnel. No  was used. Respiratory Distress   This is a new problem. The problem occurs continuously. The current episode started less than 1 hour ago. The problem has been gradually worsening. Written by JASIEL Chun, as dictated by Severiano Galea, MD    Past Medical History:   Diagnosis Date    A-fib [facility])     Aortic dissection ([facility] [address]     COPD (chronic obstructive pulmonary disease) ([facility] [address]     Heart failure ([facility] [address]     High cholesterol     Hypertension        Past Surgical History:   Procedure Laterality Date    CARDIAC SURG PROCEDURE UNLIST           History reviewed. No pertinent family history. Social History     Social History    Marital status: SINGLE     Spouse name: N/A    Number of children: N/A    Years of education: N/A     Occupational History    Not on file.      Social History Main Topics    Smoking status: Former Smoker     Packs/day: 1.00     Years: 25.00     Types: Cigarettes     Quit date: 2/18/2004    Smokeless tobacco: Not on file    Alcohol use Yes      Comment: occasional alcohol ingestion    Drug use: Yes     Special: Marijuana      Comment: occasional    Sexual activity: Not on file     Other Topics Concern    Not on file Social History Narrative         ALLERGIES: Pcn [penicillins]    Review of Systems   Unable to perform ROS: Acuity of condition   Constitutional: Positive for diaphoresis. Respiratory: Positive for shortness of breath. Vitals:    04/16/17 1800 04/16/17 1900 04/16/17 1908 04/16/17 2000   BP: 134/59 138/84  139/82   Pulse: 74 67  73   Resp: 17 20 21   Temp:   97.8 °F (36.6 °C)    SpO2: 99% 99%  99%   Weight:       Height:                Physical Exam   Constitutional: She is oriented to person, place, and time. She appears well-developed and well-nourished. She appears lethargic. She appears distressed. Pt arrived in severe respiratory distress. Pt thrashing back and forth in bed from respiratory distress   HENT:   Head: Normocephalic and atraumatic. Eyes: Pupils are equal, round, and reactive to light. Neck: Neck supple. JVD present. Cardiovascular: Regular rhythm, intact distal pulses and normal pulses. Tachycardia present. Pt initially came in with V-tach with rate of 198. Positive pulse   Pulmonary/Chest: Breath sounds normal. Accessory muscle usage (Bilateral subcostal retractions) present. She is in respiratory distress. She has no wheezes. She has no rales. She exhibits no tenderness. Abdominal: Soft. She exhibits no distension and no mass. There is no tenderness. There is no guarding. Abdomen with large scars   Musculoskeletal: Normal range of motion. She exhibits no edema or tenderness. Extremities with edema   Neurological: She is oriented to person, place, and time. She appears lethargic. No cranial nerve deficit. Weak but symmetrical strength to BUE and BLE. Skin: No rash noted. Psychiatric: She has a normal mood and affect. Her behavior is normal. Thought content normal.   Nursing note and vitals reviewed.      RESULTS:    CARDIAC MONITOR NOTE:  12:20 AM   Cardiac Rhythm: Sinus tachycardia  Rate: 105 bpm  Intervention: Monitor        EKG interpretation: (Preliminary)  Sinus tachycardia with short WA with premature supraventricular complexes with frequent consecutive PVCs and fusion complexes, Left axis deviation, RBBB, 181 bpm, Septal infarct  EKG read by Danny Nguyen MD  at 12:12 AM    EKG interpretation: (Secondary)  Atrial fibrillation with rapid ventricular response with premature ventricular or aberrantly conducted complexes, LAD, LBBB, 174 bpm  EKG read by Danny Nguyen MD  at 12:13 AM    EKG interpretation: (Tertiary)  Sinus tachycardia with fusion complexes, LLAD, LBBB, 112 bpm  EKG read by Danny Nguyen MD  at 12:18 AM      X-RAY FINDINGS:  1:37 AM  Chest x-ray shows cardiomegaly and CHF  Pending review by Radiologist  Recorded by Danielle Rivera ED Scribe, as dictated by Danny Nguyen MD       XR CHEST PORT   Final Result           Labs Reviewed   CBC WITH AUTOMATED DIFF - Abnormal; Notable for the following:        Result Value    RDW 16.1 (*)     ABS.  LYMPHOCYTES 5.0 (*)     All other components within normal limits   METABOLIC PANEL, COMPREHENSIVE - Abnormal; Notable for the following:     Chloride 109 (*)     CO2 18 (*)     Glucose 291 (*)     BUN 20 (*)     Creatinine 2.30 (*)     BUN/Creatinine ratio 9 (*)     GFR est AA 26 (*)     GFR est non-AA 22 (*)     ALT (SGPT) 62 (*)     AST (SGOT) 57 (*)     Albumin 3.2 (*)     All other components within normal limits   URINALYSIS W/ RFLX MICROSCOPIC - Abnormal; Notable for the following:     Protein 100 (*)     Blood TRACE (*)     All other components within normal limits   PROTHROMBIN TIME + INR - Abnormal; Notable for the following:     Prothrombin time 25.2 (*)     INR 2.4 (*)     All other components within normal limits   PRO-BNP - Abnormal; Notable for the following:     NT pro-BNP 3450 (*)     All other components within normal limits   CARDIAC PANEL,(CK, CKMB & TROPONIN) - Abnormal; Notable for the following:      (*)     CK - MB 5.6 (*)     Troponin-I, Qt. 0.98 (*) All other components within normal limits   URINE MICROSCOPIC ONLY - Abnormal; Notable for the following:     Bacteria FEW (*)     All other components within normal limits   CBC W/O DIFF - Abnormal; Notable for the following:     RDW 15.9 (*)     All other components within normal limits   PTT - Abnormal; Notable for the following:     aPTT 40.0 (*)     All other components within normal limits   PROTHROMBIN TIME + INR - Abnormal; Notable for the following:     Prothrombin time 19.7 (*)     INR 1.8 (*)     All other components within normal limits   CARDIAC PANEL,(CK, CKMB & TROPONIN) - Abnormal; Notable for the following:      (*)     Troponin-I, Qt. 0.39 (*)     All other components within normal limits   METABOLIC PANEL, COMPREHENSIVE - Abnormal; Notable for the following:     Glucose 108 (*)     BUN 24 (*)     Creatinine 1.77 (*)     GFR est AA 35 (*)     GFR est non-AA 29 (*)     Albumin 3.2 (*)     All other components within normal limits   POC G3 - Abnormal; Notable for the following:     pH (POC) 7.169 (*)     pO2 (POC) 281 (*)     HCO3 (POC) 15.7 (*)     sO2 (POC) 100 (*)     All other components within normal limits   POC G3 - Abnormal; Notable for the following:     pH (POC) 7.334 (*)     pO2 (POC) 66 (*)     HCO3 (POC) 20.0 (*)     All other components within normal limits   CARDIAC PANEL,(CK, CKMB & TROPONIN)   PTT   BLOOD GAS, ARTERIAL   BLOOD GAS, ARTERIAL   TSH 3RD GENERATION   MAGNESIUM   GLUCOSE, POC       No results found for this or any previous visit (from the past 12 hour(s)). MDM  Number of Diagnoses or Management Options  Diagnosis management comments: INITIAL CLINICAL IMPRESSION and PLANS:  The patient presents with the primary complaint(s) of: Respiratory distress. The presentation, to include historical aspects and clinical findings are consistent with the DX of ACS.  However, other possible DX's to consider as primary, associated with, or exacerbated by include:    MI, CHF, PE, Dissection, Pericarditis, PNA    Considering the above, my initial management plan to evaluate and therapeutic interventions include the following and as noted in the orders:    1. Labs: Pro BNP, Comprehensive metabolic panel, Urinalysis w/ RFLX microscopic, Prothrombin, PTT, Cardiac panel, CBC  2. Imaging: Chest x-ray, EKG         Amount and/or Complexity of Data Reviewed  Clinical lab tests: reviewed and ordered (Pro BNP, Comprehensive metabolic panel, Urinalysis w/ RFLX microscopic, Prothrombin, PTT, Cardiac panel, CBC)  Tests in the radiology section of CPT®: reviewed and ordered (Chest x-ray)  Tests in the medicine section of CPT®: reviewed and ordered (EKG)  Discuss the patient with other providers: yes Suman Li MD (Hospitalist), Dr. Margareth Simpson  (Cardiology))  Independent visualization of images, tracings, or specimens: yes (Chest x-ray, EKG)    Critical Care  Total time providing critical care: 30-74 minutes (60 minutes)    ED Course     Medications   LORazepam (ATIVAN) 2 mg/mL injection (0 mg  Held 4/15/17 0015)   furosemide (LASIX) 10 mg/mL injection (0 mg  Held 4/15/17 0015)   amiodarone (NEXTERONE) 360 mg in dextrose 200 mL (1.8 mg/mL) 360 mg/200 mL (1.8 mg/mL) infusion (1 mg/min IntraVENous Continued On Admission 4/15/17 0321)   sodium chloride (NS) flush 5-10 mL (10 mL IntraVENous Given 4/16/17 1802)   sodium chloride (NS) flush 5-10 mL (not administered)   hyaluronidase, human recomb.  (HYLENEX) 75 Units in sodium chloride 0.9 % 4.5 mL syringe ( SubCUTAneous Given by Provider 4/15/17 0125)   Warfarin- Rx to Dose & Monitor (not administered)   amiodarone (CORDARONE) tablet 400 mg (400 mg Oral Given 4/16/17 1906)   losartan (COZAAR) tablet 25 mg (not administered)   carvedilol (COREG) tablet 25 mg (25 mg Oral Given 4/16/17 1906)   furosemide (LASIX) tablet 40 mg (40 mg Oral Given 4/16/17 1906)   LORazepam (ATIVAN) injection 2 mg (2 mg IntraVENous Given 4/15/17 0014)   furosemide (LASIX) injection 60 mg (60 mg IntraVENous Given 4/15/17 0018)   amiodarone (CORDARONE) 150 mg in dextrose 5% 100 ml bolus premix 150 mg (0 mg IntraVENous IV Completed 4/15/17 0038)   amiodarone (NEXTERONE) 360 mg in dextrose 200 mL (1.8 mg/mL) infusion (1 mg/min IntraVENous Given 4/15/17 0042)   warfarin (COUMADIN) tablet 5 mg (5 mg Oral Given 4/15/17 1724)   furosemide (LASIX) injection 60 mg (60 mg IntraVENous Given 4/15/17 2320)   warfarin (COUMADIN) tablet 7.5 mg (7.5 mg Oral Given 4/16/17 1755)       Cardioversion, electrical  Date/Time: 4/15/2017 12:15 AM  Performed by: Pritesh Torres  Authorized by: Pritesh Torres     Consent:     Consent obtained:  Emergent situation    Consent given by:  Patient    Risks discussed:  Cutaneous burn, induced arrhythmia, pain and death  Pre-procedure details:     Cardioversion basis:  Emergent    Rhythm:  Ventricular tachycardia    Electrode placement:  Anterior-posterior  Attempt one:     Cardioversion mode:  Synchronous    Waveform:  Biphasic    Shock (Joules):  100    Cardioversion outcome attempt one: conversion to sinus tachycardia. Post-procedure details:     Patient status:  Awake    Patient tolerance of procedure: Tolerated well, no immediate complications  Central Line  Date/Time: 4/15/2017 12:17 AM  Performed by: Pritesh Torres  Authorized by: Pritesh Torres     Consent:     Consent obtained:  Emergent situation    Consent given by:  Patient and healthcare agent    Risks discussed:  Bleeding, infection, arterial puncture, incorrect placement, pneumothorax and nerve damage  Pre-procedure details:     Hand hygiene: Hand hygiene performed prior to insertion      Sterile barrier technique: All elements of maximal sterile technique followed      Skin preparation agent: Skin preparation agent completely dried prior to procedure    Procedure details:     Location: Right EJ.     Patient position:  Flat    Landmarks identified: yes      Ultrasound guidance: no      Number of attempts:  1    Successful placement: yes    Post-procedure details:     Patient tolerance of procedure: Tolerated well, no immediate complications           PROGRESS NOTE:  12:06 AM  Initial assessment performed. Written by JASIEL Vallejo, as dictated by Kvng Mckinney MD     PROGRESS NOTE:   12:14 AM   BiPAP started followed by 2 injections of Ativan (12:13 AM) . Pt was then cardioverted (12:14 AM)  Written by JASIEL Vallejo, as dictated by Kvng Mckinney MD .    CONSULT NOTE:   12:59 AM  Kvng Mckinney MD  spoke with Amy Dwyer MD    Specialty: Hospitalist  Discussed pt's hx, disposition, and available diagnostic and imaging results over the telephone. Reviewed care plans. Consulting physician agrees with plans as outlined. Agrees to admit pt to ICU. Written by JASIEL Vallejo, as dictated by Kvng Mckinney MD .    PROGRESS NOTE:   1:35 AM  RN reported possible infiltration of amiodarone on the the IV. There is very slight swelling of the area. Discussed this with pharmacist and gave pt first dose of high first antidote of hyaluronidase (.2 mg subcutaneous with  5 courses around site). Written by JASIEL Vallejo, as dictated by Kvng Mckinney MD .       ADMISSION NOTE:  1:35 AM  Patient is being admitted to the hospital by Amy Dwyer MD . The results of their tests and reasons for their admission have been discussed with them and/or available family. They convey agreement and understanding for the need to be admitted and for their admission diagnosis. Written by JASIEL Vallejo, as dictated by Kvng Mckinney MD .     CONSULT NOTE:   2:11 AM  Kvng Mckinney MD  spoke with Dr. Velma Zarate: Cardiology  Discussed pt's hx, disposition, and available diagnostic and imaging results over the telephone. Reviewed care plans. Consulting physician agrees with plans as outlined.     Written by Martha Lopez, ED Scribe, as dictated by Glenroy Orourke MD .      CLINICAL IMPRESSION:    1. Respiratory distress    2. V-tach ([address]    3. Acute pulmonary edema (HCC)         1:34 AM  I have spent 60 minutes of critical care time involved in lab review, consultations with specialist, family decision-making, and documentation. During this entire length of time I was immediately available to the patient. Critical Care: The reason for providing this level of medical care for this critically ill patient was due a critical illness that impaired one or more vital organ systems such that there was a high probability of imminent or life threatening deterioration in the patients condition. This care involved high complexity decision making to assess, manipulate, and support vital system functions, to treat this degreee vital organ system failure and to prevent further life threatening deterioration of the patients condition. PLAN: Admit to ICU    ATTESTATIONS:  This note is prepared by Martha Lopez, acting as Scribe for Glenroy Orourke MD .    Glenroy Orourke MD : The scribe's documentation has been prepared under my direction and personally reviewed by me in its entirety. I confirm that the note above accurately reflects all work, treatment, procedures, and medical decision making performed by me.

## 2017-04-15 NOTE — Clinical Note
Status[de-identified] Inpatient [101] Type of Bed: Intensive Care [6] Inpatient Hospitalization Certified Necessary for the Following Reasons: 4. Patient requires ICU level of care interventions (further clarification in H&P documentation) Admitting Diagnosis: Respiratory distress [916875] Admitting Diagnosis: V-tach [facility]) [[id number]] Admitting Diagnosis: Acute pulmonary edema ([facility] [address] [[id number]] Admitting Physician: Cyrus Mera [[id number]] Attending Physician: Cyrus Mera [[id number]] Estimated Length of Stay: > or = to 2 Midnights Discharge Plan[de-identified] Home with Office Follow-up

## 2017-04-15 NOTE — PROGRESS NOTES
When  attempted to visit patient in the ICU it was not a good time to visit. Staff was with her providing medical care. Chaplains will provide spiritual care as needed, as requested. Babatunde Alba MDiv.   Board Certified [employer] [phone]

## 2017-04-15 NOTE — PROGRESS NOTES
@5050 Pt admitted from ED awake alert oriented x4 on BIPAP, denies pain at present. Remains on Amiodarone drip via rt EJ. Rt AC slightly swollen , no redness seen. Elevated on pillow and cold compress applied. Was reported from previous nurse that there is possible infiltration ,observation continues. Bruise observed to pts right side of hip ,pt denies pain and does not know what caused it per her verbalization. Assessment done and documented in appropriate flow sheets. Pt son came back and was oriented to ICU and our visiting hours. Pt to tired to complete admission data base at this time and son does not have information daughter will answer questions when she visits. Nursing management and care continues . @0500 pt asleep easily aroused vital signs stable. No changes in rt arm , flacc 0 at present nursing observation continues. @0700 no new developments care continues. @9009 Bedside and Verbal shift change report given to Garett Shen (oncoming nurse) by Deb Cerda (offgoing nurse). Report included the following information SBAR, Kardex, Intake/Output, MAR, Recent Results and Med Rec Status.    Alarm parameters reviewed, on and audible Appropriate for patient clinical condition

## 2017-04-15 NOTE — ED NOTES
At completion of amiodorone loading dose, this RN notes swelling to area surrounding site. RN who initiated IV reports swelling to site initially. Pt denies pain, site flushes well. No redness or warmth noted. Due to risk of possible infiltration, site discontinued and pharmacy contacted for antidote and MD made aware. Family aware as well.

## 2017-04-15 NOTE — H&P
History & Physical    Patient: Gina Medina MRN: [id number]  CSN: [id number]    YOB: 1955  Age: 64 y.o. Sex: female      DOA: 4/15/2017  Primary Care Provider:  PROVIDER UNKNOWN      Assessment/Plan     Patient Active Problem List   Diagnosis Code    Acute respiratory failure ([facility]) J96.00    Pneumonia, organism unspecified J18.9    Acute on chronic systolic congestive heart failure ([facility]) I50.23    Respiratory failure, acute ([facility]) J96.00    PAF (paroxysmal atrial fibrillation) ([facility]) I48.0    Acute on chronic renal failure ([facility]) N17.9, N18.9    Pneumonia J18.9    New onset a-fib ([facility] [address] I48.91    CKD (chronic kidney disease) stage 3, GFR 30-59 ml/min X25.7    Systolic CHF, acute ([facility]) I50.21    Hypertension, essential I10    A-fib ([facility]) I48.91    Dehydration E86.0    Atrial fibrillation with RVR ([facility]) I48.91    Acute pulmonary edema ([facility]) J81.0    V-tach ([facility]) I47.2    Respiratory distress R06.00    COPD (chronic obstructive pulmonary disease) ([facility]) J44.9    History of aortic dissection Z86.79    HLD (hyperlipidemia) E78.5    Elevated brain natriuretic peptide (BNP) level R79.89       1. Acute Severe Respiratory Distress  2. A fib with rvr with possible V tach with pulse, Now in NSR  3. Moderate Acute on Chronic Systolic CHF ef ~86%  4. Elevated BNP  5. Hx of Aortic Dissection repair   6. COPD? 7. HLD  8. 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 Combined Metabolic/Resp Acidosis  FULL CODE     -admit to the ICU  -patient is currently on amiodarone drip in NSR. INR is therapeutic   -respiratory support with BiPAP, repeat ABG. -lasix IV given in ED. Monitor I/Os. lytes  -consult cardiology   -check 2d echo, trend cardiac enzymes, tsh   -wean off BiPAP  -wound care to monitor for her right arm amio infiltration. Pharmacy notified and antidote given.  Maintain Cold Compresses   -once she is able to answer all the questions appropriately, obtain a full medication list to confirm   -no concerns of PE at this time as there is evidence of other causes contributing to her condition and her INR is >2        CC: Acute SOB       HPI:     Dorita Martinez is a 64 y.o. female who comes to the hospital with complaints of severe sob. Patient was getting ready to go play [employer] around 11pm last night and when she got out of her car, she suddenly became sob. At first she said she felt hot for few minutes and then started complaining of sob. She tells me she has had similar symptoms in the past when she goes in to A fib with rvr. She reports of medical compliance but in the past there is documentation of non compliance. Prior to this episode, she was at her baseline and didn't have any complaints. EMS was called and she was immediately brought to the ED. At some point her sob was worst to a point where she could barely even talk. In the ER she was noted to be in A fib with rvr at first and then her rates went up in 200 where it almost looked like V tach with Pulse per ED staff, sync cardioversion was performed and she was started on Amiodarone drip. This converted her to NSR. She was maintained on BiPAP on which she felt much comfortable. Her ABG showed acidosis, most likely combined metabolic and resp. When I arrived at bedside, patient was comfortable on BiPAP but just felt tired from what had happened. She requested for me to get hx from the family present at the bedside and she confirmed it. No other complaints at this time. FULL CODE     Past Medical History:   Diagnosis Date    A-fib [facility])     Aortic dissection (HCC)     COPD (chronic obstructive pulmonary disease) (HCC)     Heart failure (HCC)     High cholesterol     Hypertension        Past Surgical History:   Procedure Laterality Date    CARDIAC SURG PROCEDURE UNLIST         History reviewed. No pertinent family history.     Social History     Social History    Marital status: SINGLE     Spouse name: N/A   Joshua Laguna Number of children: N/A    Years of education: N/A     Social History Main Topics    Smoking status: Former Smoker     Packs/day: 1.00     Years: 25.00     Types: Cigarettes     Quit date: 2/18/2004    Smokeless tobacco: None    Alcohol use Yes      Comment: occasional alcohol ingestion    Drug use: Yes     Special: Marijuana      Comment: occasional    Sexual activity: Not Asked     Other Topics Concern    None     Social History Narrative       Prior to Admission medications    Medication Sig Start Date End Date Taking? Authorizing Provider   HYDROcodone-acetaminophen (NORCO) 5-325 mg per tablet Take 1-2 Tabs by mouth every four (4) hours as needed for Pain. Max Daily Amount: 12 Tabs. Indications: Will cause sedation. Take with Miralax to prevent constipation 7/30/16   Arlene Yao PA-C   diltiazem CD (CARDIZEM CD) 120 mg ER capsule Take 1 Cap by mouth daily. 6/10/16   Mir James MD   valsartan (DIOVAN) 160 mg tablet Take 160 mg by mouth daily. Tracy Leung MD   umeclidinium-vilanterol (ANORO ELLIPTA) 62.5-25 mcg/actuation inhaler Take 1 Puff by inhalation daily. Tracy Leung MD   carvedilol (COREG) 25 mg tablet Take 1 Tab by mouth two (2) times daily (with meals). 10/2/15   Mir James MD   spironolactone (ALDACTONE) 25 mg tablet Take 1 Tab by mouth daily. 10/2/15   Mir James MD   furosemide (LASIX) 20 mg tablet Take 1 Tab by mouth daily. 10/2/15   Mir James MD   warfarin (COUMADIN) 5 mg tablet Take 1 Tab by mouth every evening. 10/2/15   Mir James MD       Allergies   Allergen Reactions    Pcn [Penicillins] Unknown (comments)       Review of Systems  Gen: No fever, chills, malaise, weight loss/gain. Heent: No headache, rhinorrhea, epistaxis, ear pain, hearing loss, sinus pain, neck pain/stiffness, sore throat. Heart: No chest pain, palpitations  Resp: No cough, hemoptysis  GI: No nausea, vomiting, diarrhea, constipation, melena or hematochezia.    : No urinary obstruction, dysuria or hematuria. Derm: No rash, new skin lesion or pruritis. Musc/skeletal: no bone or joint complains. Vasc: No edema, cyanosis or claudication. Endo: No heat/cold intolerance, no polyuria,polydipsia or polyphagia. Neuro: No unilateral weakness, numbness, tingling. No seizures. Heme: No easy bruising or bleeding. Physical Exam:     Physical Exam:  Visit Vitals    /65    Pulse 75    Temp 97.9 °F (36.6 °C)    Resp 19    Ht 5' 5\" (1.651 m)    Wt 70.3 kg (155 lb)    SpO2 98%    BMI 25.79 kg/m2    O2 Flow Rate (L/min): 15 l/min O2 Device: BIPAP    Temp (24hrs), Av.9 °F (36.6 °C), Min:97.9 °F (36.6 °C), Max:97.9 °F (36.6 °C)             General:  Awake, tired; + BiPAP in place   Head:  Normocephalic, without obvious abnormality, atraumatic. Eyes:  Conjunctivae/corneas clear, sclera anicteric, PERRL, EOMs intact. Nose: Nares normal. No drainage or sinus tenderness. Throat: Lips, mucosa, and tongue normal.    Neck: Supple, symmetrical, trachea midline, no adenopathy. Lungs:   Bibasilar crackles       Heart:  Regular rate and rhythm, S1, S2 normal, no murmur, click, rub or gallop. Abdomen: Soft, non-tender. Bowel sounds normal. No masses,  No organomegaly. Extremities: Extremities normal, atraumatic, no cyanosis or edema. Capillary refill normal.   Pulses: 2+ and symmetric all extremities. Skin: Skin color pink/pale/mottled/flushed, turgor normal. No rashes or lesions   Neurologic: CNII-XII intact. No focal motor or sensory deficit.        Labs Reviewed:  CXR: possible signs of vol overload   Recent Results (from the past 24 hour(s))   CARDIAC PANEL,(CK, CKMB & TROPONIN)    Collection Time: 04/15/17 12:20 AM   Result Value Ref Range     26 - 192 U/L    CK - MB 1.5 <3.6 ng/ml    CK-MB Index 0.9 0.0 - 4.0 %    Troponin-I, Qt. 0.05 0.00 - 0.06 NG/ML   CBC WITH AUTOMATED DIFF    Collection Time: 04/15/17 12:20 AM   Result Value Ref Range WBC 10.3 4.6 - 13.2 K/uL    RBC 4.35 4.20 - 5.30 M/uL    HGB 12.8 12.0 - 16.0 g/dL    HCT 39.7 35.0 - 45.0 %    MCV 91.3 74.0 - 97.0 FL    MCH 29.4 24.0 - 34.0 PG    MCHC 32.2 31.0 - 37.0 g/dL    RDW 16.1 (H) 11.6 - 14.5 %    PLATELET 304 289 - 600 K/uL    MPV 11.6 9.2 - 11.8 FL    NEUTROPHILS 43 42 - 75 %    LYMPHOCYTES 48 20 - 51 %    MONOCYTES 8 2 - 9 %    EOSINOPHILS 1 0 - 5 %    BASOPHILS 0 0 - 3 %    ABS. NEUTROPHILS 4.4 1.8 - 8.0 K/UL    ABS. LYMPHOCYTES 5.0 (H) 0.8 - 3.5 K/UL    ABS. MONOCYTES 0.8 0 - 1.0 K/UL    ABS. EOSINOPHILS 0.1 0.0 - 0.4 K/UL    ABS. BASOPHILS 0.0 0.0 - 0.1 K/UL    RBC COMMENTS ANISOCYTOSIS  1+        RBC COMMENTS OVALOCYTES  FEW        WBC COMMENTS REACTIVE LYMPHS      DF MANUAL     METABOLIC PANEL, COMPREHENSIVE    Collection Time: 04/15/17 12:20 AM   Result Value Ref Range    Sodium 144 136 - 145 mmol/L    Potassium 4.9 3.5 - 5.5 mmol/L    Chloride 109 (H) 100 - 108 mmol/L    CO2 18 (L) 21 - 32 mmol/L    Anion gap 17 3.0 - 18 mmol/L    Glucose 291 (H) 74 - 99 mg/dL    BUN 20 (H) 7.0 - 18 MG/DL    Creatinine 2.30 (H) 0.6 - 1.3 MG/DL    BUN/Creatinine ratio 9 (L) 12 - 20      GFR est AA 26 (L) >60 ml/min/1.73m2    GFR est non-AA 22 (L) >60 ml/min/1.73m2    Calcium 8.7 8.5 - 10.1 MG/DL    Bilirubin, total 0.3 0.2 - 1.0 MG/DL    ALT (SGPT) 62 (H) 13 - 56 U/L    AST (SGOT) 57 (H) 15 - 37 U/L    Alk.  phosphatase 113 45 - 117 U/L    Protein, total 7.2 6.4 - 8.2 g/dL    Albumin 3.2 (L) 3.4 - 5.0 g/dL    Globulin 4.0 2.0 - 4.0 g/dL    A-G Ratio 0.8 0.8 - 1.7     PROTHROMBIN TIME + INR    Collection Time: 04/15/17 12:20 AM   Result Value Ref Range    Prothrombin time 25.2 (H) 11.5 - 15.2 sec    INR 2.4 (H) 0.8 - 1.2     PTT    Collection Time: 04/15/17 12:20 AM   Result Value Ref Range    aPTT 35.1 23.0 - 36.4 SEC   PRO-BNP    Collection Time: 04/15/17 12:20 AM   Result Value Ref Range    NT pro-BNP 3450 (H) 0 - 900 PG/ML   POC G3    Collection Time: 04/15/17 12:38 AM   Result Value Ref Range Device: BIPAP      FIO2 (POC) 1.0 %    pH (POC) 7.169 (LL) 7.35 - 7.45      pCO2 (POC) 43.1 35.0 - 45.0 MMHG    pO2 (POC) 281 (H) 80 - 100 MMHG    HCO3 (POC) 15.7 (L) 22 - 26 MMOL/L    sO2 (POC) 100 (H) 92 - 97 %    Base deficit (POC) 13 mmol/L    PEEP/CPAP (POC) 5 cmH2O    PIP (POC) 12      Pressure support 7 cmH2O    Allens test (POC) N/A      Total resp. rate 30      Site RIGHT RADIAL      Patient temp. 37.0      Specimen type (POC) ARTERIAL      Performed by Beatriz Pritchett     Spontaneous timed YES         Procedures/imaging: see electronic medical records for all procedures/Xrays and details which were not copied into this note but were reviewed prior to creation of Plan    70 minutes of critical care time spent in the direct evaluation and treatment of this high risk patient. The reason for providing this level of medical care for this critically ill patient was due a critical illness that impaired one or more vital organ systems such that there was a high probability of imminent or life threatening deterioration in the patients condition. This care involved high complexity decision making to assess, manipulate, and support vital system functions, to treat this degreee vital organ system failure and to prevent further life threatening deterioration of the patients condition.       CC: PROVIDER UNKNOWN

## 2017-04-15 NOTE — ED NOTES
TRANSFER - OUT REPORT:    Verbal report given to [address] RN(name) on Robert Montano  being transferred to ICU(unit) for routine progression of care       Report consisted of patients Situation, Background, Assessment and   Recommendations(SBAR). Information from the following report(s) SBAR, ED Summary, Intake/Output, MAR, Recent Results, Med Rec Status and Cardiac Rhythm SR with LBBB was reviewed with the receiving nurse. Lines:   Peripheral IV 04/15/17 Right External jugular (Active)   Site Assessment Clean, dry, & intact 4/15/2017 12:33 AM   Phlebitis Assessment 0 4/15/2017 12:33 AM   Infiltration Assessment 0 4/15/2017 12:33 AM   Dressing Type Transparent 4/15/2017 12:33 AM   Hub Color/Line Status Green 4/15/2017 12:33 AM   Action Taken Blood drawn 4/15/2017 12:33 AM        Opportunity for questions and clarification was provided. Patient transported with:   Monitor  Registered Nurse  Tech   BiPap.

## 2017-04-15 NOTE — PROGRESS NOTES
No complaints this morning. Patient states she is feeling much better. Her vitals are stable at this time. SHe is tolerating BiPAP well. Dr Xavier Boast to see the patient this morning. Other continue care as planned.

## 2017-04-15 NOTE — IP AVS SNAPSHOT
Current Discharge Medication List  
  
START taking these medications Dose & Instructions Dispensing Information Comments Morning Noon Evening Bedtime  
 amiodarone 200 mg tablet Commonly known as:  CORDARONE Your last dose was: Your next dose is:    
   
   
 Dose:  200 mg Take 1 Tab by mouth daily. Quantity:  30 Tab Refills:  2  
     
   
   
   
  
 losartan 25 mg tablet Commonly known as:  COZAAR Your last dose was: Your next dose is:    
   
   
 Dose:  25 mg Take 1 Tab by mouth daily. Quantity:  30 Tab Refills:  2  
     
   
   
   
  
 metoprolol succinate 25 mg XL tablet Commonly known as:  TOPROL-XL Your last dose was: Your next dose is:    
   
   
 Dose:  25 mg Take 1 Tab by mouth daily. Quantity:  30 Tab Refills:  2 CONTINUE these medications which have NOT CHANGED Dose & Instructions Dispensing Information Comments Morning Noon Evening Bedtime  
 furosemide 20 mg tablet Commonly known as:  LASIX Your last dose was: Your next dose is:    
   
   
 Dose:  20 mg Take 1 Tab by mouth daily. Quantity:  30 Tab Refills:  1  
     
   
   
   
  
 simvastatin 40 mg tablet Commonly known as:  ZOCOR Your last dose was: Your next dose is:    
   
   
 Dose:  40 mg Take 40 mg by mouth nightly. Refills:  0  
     
   
   
   
  
 spironolactone 25 mg tablet Commonly known as:  ALDACTONE Your last dose was: Your next dose is:    
   
   
 Dose:  25 mg Take 1 Tab by mouth daily. Quantity:  30 Tab Refills:  1  
     
   
   
   
  
 umeclidinium-vilanterol 62.5-25 mcg/actuation inhaler Commonly known as:  Del Finely Your last dose was: Your next dose is:    
   
   
 Dose:  1 Puff Take 1 Puff by inhalation daily. Refills:  0  
     
   
   
   
  
 warfarin 5 mg tablet Commonly known as:  COUMADIN  
   
 Your last dose was: Your next dose is:    
   
   
 Dose:  5 mg Take 1 Tab by mouth every evening. Quantity:  15 Tab Refills:  0 STOP taking these medications   
 carvedilol 25 mg tablet Commonly known as:  COREG  
   
  
 dilTIAZem  mg ER capsule Commonly known as:  CARDIZEM CD  
   
  
 valsartan 160 mg tablet Commonly known as:  DIOVAN Where to Get Your Medications Information on where to get these meds will be given to you by the nurse or doctor. ! Ask your nurse or doctor about these medications  
  amiodarone 200 mg tablet  
 losartan 25 mg tablet  
 metoprolol succinate 25 mg XL tablet

## 2017-04-15 NOTE — PROGRESS NOTES
Patient became tachy hours after being off bipap,discussed with RT, who placed back on bipap heart rate returned to normal Dr. Rosalba Espino notified he states to keep patient on bipap to allow her to rest for 24 hours, will continue to monitor.

## 2017-04-16 LAB
ALBUMIN SERPL BCP-MCNC: 3.2 G/DL (ref 3.4–5)
ALBUMIN/GLOB SERPL: 0.8 {RATIO} (ref 0.8–1.7)
ALP SERPL-CCNC: 75 U/L (ref 45–117)
ALT SERPL-CCNC: 48 U/L (ref 13–56)
ANION GAP BLD CALC-SCNC: 12 MMOL/L (ref 3–18)
APTT PPP: 40 SEC (ref 23–36.4)
AST SERPL W P-5'-P-CCNC: 31 U/L (ref 15–37)
ATRIAL RATE: 181 BPM
BILIRUB SERPL-MCNC: 0.4 MG/DL (ref 0.2–1)
BUN SERPL-MCNC: 24 MG/DL (ref 7–18)
BUN/CREAT SERPL: 14 (ref 12–20)
CALCIUM SERPL-MCNC: 8.7 MG/DL (ref 8.5–10.1)
CALCULATED P AXIS, ECG09: 76 DEGREES
CALCULATED R AXIS, ECG10: -88 DEGREES
CALCULATED T AXIS, ECG11: 115 DEGREES
CHLORIDE SERPL-SCNC: 106 MMOL/L (ref 100–108)
CK MB CFR SERPL CALC: 1 % (ref 0–4)
CK MB SERPL-MCNC: 2.4 NG/ML (ref 5–25)
CK SERPL-CCNC: 239 U/L (ref 26–192)
CO2 SERPL-SCNC: 22 MMOL/L (ref 21–32)
CREAT SERPL-MCNC: 1.77 MG/DL (ref 0.6–1.3)
DIAGNOSIS, 93000: NORMAL
ERYTHROCYTE [DISTWIDTH] IN BLOOD BY AUTOMATED COUNT: 15.9 % (ref 11.6–14.5)
GLOBULIN SER CALC-MCNC: 3.9 G/DL (ref 2–4)
GLUCOSE SERPL-MCNC: 108 MG/DL (ref 74–99)
HCT VFR BLD AUTO: 37.3 % (ref 35–45)
HGB BLD-MCNC: 12.6 G/DL (ref 12–16)
INR PPP: 1.8 (ref 0.8–1.2)
MCH RBC QN AUTO: 29.4 PG (ref 24–34)
MCHC RBC AUTO-ENTMCNC: 33.8 G/DL (ref 31–37)
MCV RBC AUTO: 86.9 FL (ref 74–97)
P-R INTERVAL, ECG05: 96 MS
PLATELET # BLD AUTO: 182 K/UL (ref 135–420)
PMV BLD AUTO: 10.4 FL (ref 9.2–11.8)
POTASSIUM SERPL-SCNC: 3.6 MMOL/L (ref 3.5–5.5)
PROT SERPL-MCNC: 7.1 G/DL (ref 6.4–8.2)
PROTHROMBIN TIME: 19.7 SEC (ref 11.5–15.2)
Q-T INTERVAL, ECG07: 286 MS
QRS DURATION, ECG06: 152 MS
QTC CALCULATION (BEZET), ECG08: 496 MS
RBC # BLD AUTO: 4.29 M/UL (ref 4.2–5.3)
SODIUM SERPL-SCNC: 140 MMOL/L (ref 136–145)
TROPONIN I SERPL-MCNC: 0.39 NG/ML (ref 0–0.06)
VENTRICULAR RATE, ECG03: 181 BPM
WBC # BLD AUTO: 6.6 K/UL (ref 4.6–13.2)

## 2017-04-16 PROCEDURE — 65610000006 HC RM INTENSIVE CARE

## 2017-04-16 PROCEDURE — 74011250636 HC RX REV CODE- 250/636: Performed by: INTERNAL MEDICINE

## 2017-04-16 PROCEDURE — 74011250637 HC RX REV CODE- 250/637: Performed by: INTERNAL MEDICINE

## 2017-04-16 PROCEDURE — 82550 ASSAY OF CK (CPK): CPT | Performed by: INTERNAL MEDICINE

## 2017-04-16 PROCEDURE — 85610 PROTHROMBIN TIME: CPT | Performed by: INTERNAL MEDICINE

## 2017-04-16 PROCEDURE — 80053 COMPREHEN METABOLIC PANEL: CPT | Performed by: INTERNAL MEDICINE

## 2017-04-16 PROCEDURE — 36415 COLL VENOUS BLD VENIPUNCTURE: CPT | Performed by: INTERNAL MEDICINE

## 2017-04-16 PROCEDURE — 94660 CPAP INITIATION&MGMT: CPT

## 2017-04-16 PROCEDURE — 85027 COMPLETE CBC AUTOMATED: CPT | Performed by: INTERNAL MEDICINE

## 2017-04-16 PROCEDURE — 85730 THROMBOPLASTIN TIME PARTIAL: CPT | Performed by: INTERNAL MEDICINE

## 2017-04-16 PROCEDURE — 77010033678 HC OXYGEN DAILY

## 2017-04-16 RX ORDER — LOSARTAN POTASSIUM 50 MG/1
25 TABLET ORAL DAILY
Status: DISCONTINUED | OUTPATIENT
Start: 2017-04-17 | End: 2017-04-17

## 2017-04-16 RX ORDER — WARFARIN SODIUM 5 MG/1
7.5 TABLET ORAL ONCE
Status: COMPLETED | OUTPATIENT
Start: 2017-04-16 | End: 2017-04-16

## 2017-04-16 RX ORDER — AMIODARONE HYDROCHLORIDE 200 MG/1
400 TABLET ORAL EVERY 8 HOURS
Status: DISCONTINUED | OUTPATIENT
Start: 2017-04-16 | End: 2017-04-20 | Stop reason: DRUGHIGH

## 2017-04-16 RX ORDER — FUROSEMIDE 40 MG/1
40 TABLET ORAL DAILY
Status: DISCONTINUED | OUTPATIENT
Start: 2017-04-16 | End: 2017-04-18

## 2017-04-16 RX ORDER — SIMVASTATIN 40 MG/1
40 TABLET, FILM COATED ORAL
COMMUNITY

## 2017-04-16 RX ORDER — CARVEDILOL 12.5 MG/1
25 TABLET ORAL 2 TIMES DAILY WITH MEALS
Status: DISCONTINUED | OUTPATIENT
Start: 2017-04-16 | End: 2017-04-17

## 2017-04-16 RX ADMIN — Medication 10 ML: at 18:02

## 2017-04-16 RX ADMIN — AMIODARONE HYDROCHLORIDE 0.5 MG/MIN: 1.8 INJECTION, SOLUTION INTRAVENOUS at 17:55

## 2017-04-16 RX ADMIN — WARFARIN SODIUM 7.5 MG: 5 TABLET ORAL at 17:55

## 2017-04-16 RX ADMIN — Medication 10 ML: at 06:21

## 2017-04-16 RX ADMIN — AMIODARONE HYDROCHLORIDE 0.5 MG/MIN: 1.8 INJECTION, SOLUTION INTRAVENOUS at 04:44

## 2017-04-16 RX ADMIN — FUROSEMIDE 40 MG: 40 TABLET ORAL at 19:06

## 2017-04-16 RX ADMIN — AMIODARONE HYDROCHLORIDE 400 MG: 200 TABLET ORAL at 19:06

## 2017-04-16 RX ADMIN — CARVEDILOL 25 MG: 12.5 TABLET, FILM COATED ORAL at 19:06

## 2017-04-16 NOTE — PROGRESS NOTES
Pharmacy Dosing Services: Warfarin    Consult for Warfarin Dosing by Pharmacy by Dr. Venecia Pedroza provided for this 64 y.o.  female , for indication of Atrial Fibrillation. Day of Therapy (continuation from home regimen)  Dose to achieve an INR goal of 2-3    Order entered for  Warfarin  7.5 mg to be given today at 18:00. Significant drug interactions: Amiodarone    LABS    PT/INR Lab Results   Component Value Date/Time    INR 1.8 04/16/2017 04:30 AM      Platelets Lab Results   Component Value Date/Time    PLATELET 270 39/80/4303 04:30 AM      H/H     Lab Results   Component Value Date/Time    HGB 12.6 04/16/2017 04:30 AM        Warfarin Administrations (last 168 hours)       Date/Time Action Medication Dose      04/15/17 1724 Given    warfarin (COUMADIN) tablet 5 mg 5 mg          Pharmacy to follow daily and will provide subsequent Warfarin dosing based on clinical status. Yue Mcwilliams, Pharm. D.   Clinical Pharmacist  [phone]

## 2017-04-16 NOTE — CONSULTS
[facility] Consult Note      Patient: Mallika Lal MRN: [id number]  SSN: [id number]    YOB: 1955  Age: 64 y.o. Sex: female    Date of Consultation: 04/15/2017  Referring Physician: Krysta Oliva MD  Reason for Consultation: CHF, a fib    Chief complain: Shortness of breath    HPI: 64year old female came to ER with worsening of shortness of breath. She was c/o shortness of breath for past few months but yesterday she was playing bingo and developed worsening of shortness of breath and came to ER. Denies any chest pain. In ER she was found to have wide QRS complex tachycardia and was cardioverted. At present she is on BiPAP. Denies any fever, cough, nausea, vomiting or diarrhea. She has known h/o systolic heart failure for few years. Past Medical History:   Diagnosis Date    A-fib [facility])     Aortic dissection (HCC)     COPD (chronic obstructive pulmonary disease) (HCC)     Heart failure (HCC)     High cholesterol     Hypertension      Past Surgical History:   Procedure Laterality Date    CARDIAC SURG PROCEDURE UNLIST       Current Facility-Administered Medications   Medication Dose Route Frequency    sodium chloride (NS) flush 5-10 mL  5-10 mL IntraVENous Q8H    sodium chloride (NS) flush 5-10 mL  5-10 mL IntraVENous PRN    hyaluronidase, human recomb. (HYLENEX) 75 Units in sodium chloride 0.9 % 4.5 mL syringe   SubCUTAneous PRN    amiodarone (NEXTERONE) 360 mg in dextrose 200 mL (1.8 mg/mL) infusion  0.5 mg/min IntraVENous TITRATE    Warfarin- Rx to Dose & Monitor  1 Each Other Rx Dosing/Monitoring    furosemide (LASIX) injection 60 mg  60 mg IntraVENous ONCE       Allergies and Intolerances: Allergies   Allergen Reactions    Pcn [Penicillins] Unknown (comments)       Family History:   History reviewed. No pertinent family history. Social History:   She  reports that she quit smoking about 13 years ago. Her smoking use included Cigarettes. She has a 25.00 pack-year smoking history. She does not have any smokeless tobacco history on file. She  reports that she drinks alcohol. Review of Systems:     Gen: No fever, chills, malaise, weight loss/gain. Heent: No headache, rhinorrhea, epistaxis, ear pain, hearing loss, sinus pain, neck pain/stiffness, sore throat. Heart: Positive shortness of breath No chest pain, palpitations, pnd, or orthopnea. Resp: No cough, hemoptysis, wheezing and dyspnea  GI: No nausea, vomiting, diarrhea, constipation, melena or hematochezia. : No urinary obstruction, dysuria or hematuria. Derm: No rash, new skin lesion or pruritis. Musc/skeletal: no bone or joint complains. Vasc: No edema, cyanosis or claudication. Endo: No heat/cold intolerance, no polyuria,polydipsia or polyphagia. Neuro: No unilateral weakness, numbness, tingling. No seizures. Heme: No easy bruising or bleeding. Physical:   Patient Vitals for the past 6 hrs:   Pulse Resp BP SpO2   04/15/17 2056 - - - 100 %   04/15/17 2000 65 13 146/79 100 %   04/15/17 1900 66 14 - 100 %   04/15/17 1800 65 14 108/70 100 %   04/15/17 1700 63 14 123/57 100 %         Exam:   General Appearance: Comfortable, not using accessory muscles of respiration. HEENT: SHANKAR. HEAD: Atraumatic  NECK: No JVD, no thyroidomeglay. CAROTIDS: No bruit  LUNGS: Clear bilaterally. Chest: Mid line scar   HEART: S1+S2 audible, no murmur, no pericardial rub. ABD: Non-tender, BS Audible    EXT: No edema, and no cysnosis. VASCULAR EXAM: Pulses are intact. PSYCHIATRIC EXAM: Mood is appropriate. MUSCULOSKELETAL: Grossly no joint deformity. NEUROLOGICAL: AAO times 3, Motor and sensory sytem intact and Cranial nerves II-XII intact.     Review of Data:   LABS:   Lab Results   Component Value Date/Time    WBC 10.3 04/15/2017 12:20 AM    HGB 12.8 04/15/2017 12:20 AM    HCT 39.7 04/15/2017 12:20 AM    PLATELET 704 34/33/5636 12:20 AM     Lab Results   Component Value Date/Time    Sodium 144 04/15/2017 12:20 AM Potassium 4.9 04/15/2017 12:20 AM    Chloride 109 04/15/2017 12:20 AM    CO2 18 04/15/2017 12:20 AM    Glucose 291 04/15/2017 12:20 AM    BUN 20 04/15/2017 12:20 AM    Creatinine 2.30 04/15/2017 12:20 AM     Lab Results   Component Value Date/Time    Cholesterol, total 124 06/09/2016 03:55 AM    HDL Cholesterol 34 06/09/2016 03:55 AM    LDL, calculated 72.8 06/09/2016 03:55 AM    Triglyceride 86 06/09/2016 03:55 AM     No results found for: GPT  Lab Results   Component Value Date/Time    Hemoglobin A1c 6.4 06/09/2016 03:55 AM         Cardiology Procedures:   Results for orders placed or performed during the hospital encounter of 04/15/17   EKG, 12 LEAD, INITIAL   Result Value Ref Range    Ventricular Rate 66 BPM    Atrial Rate 66 BPM    P-R Interval 160 ms    QRS Duration 154 ms    Q-T Interval 532 ms    QTC Calculation (Bezet) 557 ms    Calculated P Axis 72 degrees    Calculated R Axis -63 degrees    Calculated T Axis -153 degrees    Diagnosis       Normal sinus rhythm  Possible Left atrial enlargement  Left axis deviation  Left bundle branch block  Abnormal ECG  When compared with ECG of 15-APR-2017 00:18,  fusion complexes are no longer present  Vent.  rate has decreased BY  46 BPM  T wave inversion now evident in Inferior leads         1st EKG atrial fibrillation with RVR , LBBB    Impression / Plan:    Patient Active Problem List   Diagnosis Code    Acute respiratory failure ([facility]) J96.00    Pneumonia, organism unspecified J18.9    Acute on chronic systolic congestive heart failure ([facility]) I50.23    Respiratory failure, acute ([facility]) J96.00    PAF (paroxysmal atrial fibrillation) ([facility]) I48.0    Acute on chronic renal failure ([facility]) N17.9, N18.9    Pneumonia J18.9    New onset a-fib ([facility]) I48.91    CKD (chronic kidney disease) stage 3, GFR 30-59 ml/min Z12.5    Systolic CHF, acute ([facility]) I50.21    Hypertension, essential I10    A-fib ([facility]) I48.91    Dehydration E86.0    Atrial fibrillation with RVR ([facility] [address] I48.91    Acute pulmonary edema (HCC) J81.0    V-tach (HCC) I47.2    Respiratory distress R06.00    COPD (chronic obstructive pulmonary disease) (HCC) J44.9    History of aortic dissection Z86.79    HLD (hyperlipidemia) E78.5    Elevated brain natriuretic peptide (BNP) level R79.89     Initial EKG revealed Wide QRS complex tachycardia - a fib with underlying LBBB  Patient was cardioverted in ER. Persistent atrial fibrillation currently in Sinus rhythm  Abnormal troponin    Will give one dose of IV Lasix  Serial cardiac enzymes   Continue IV amiodarone  Will start PO amiodarone from tomorrow. Will restart Beta blocker and ACEI/ARB  Continue Coumadin PT/INR goal 2-3   Continue management as per hospital medicine  Plan discussed with patient and her family at bed side.      Signed By: Terri Madden MD     April 15, 2017

## 2017-04-16 NOTE — PROGRESS NOTES
0730- Bedside and Verbal shift change report given to JOSE Camejo (oncoming nurse) by Froy Kaufman RN (offgoing nurse). Report included the following information SBAR, Kardex, Intake/Output, MAR, Recent Results and Cardiac Rhythm SR with BBB.    0800- Initial shift assessment complete. Patient on BIPAP. No complaints of pain. VS stable. Will continue to monitor. 1200- Reassessment complete. VS remain stable. Will continue to monitor. 1400- Per Dr Lorena Silveira, ok to take patient off BIPAP and place on NC as tolerated. Diet orders received. Orders received to stop amiodarone drip once PO dose is given. Patient off BIPAP, on 3L NC. Patient tolerating well.     1600- Reassessment complete. VS remain stable. Will continue to monitor. Demetrice and spoke with Dr Lorena Silveira, gave SBAR, let him know that patient continues to go in and out of A Fib with rates in 120's. Per MD this is expected, continue to monitor. No new orders received. 1930- Bedside and Verbal shift change report given to LAZARA Platt, CHANG (oncoming nurse) by JOSE Camejo (offgoing nurse). Report included the following information SBAR, Kardex, Intake/Output, MAR, Recent Results and Cardiac Rhythm SR with BBB.

## 2017-04-17 LAB
ATRIAL RATE: 66 BPM
CALCULATED P AXIS, ECG09: 72 DEGREES
CALCULATED R AXIS, ECG10: -63 DEGREES
CALCULATED T AXIS, ECG11: -153 DEGREES
DIAGNOSIS, 93000: NORMAL
EST. AVERAGE GLUCOSE BLD GHB EST-MCNC: 134 MG/DL
GLUCOSE BLD STRIP.AUTO-MCNC: 101 MG/DL (ref 70–110)
GLUCOSE BLD STRIP.AUTO-MCNC: 104 MG/DL (ref 70–110)
GLUCOSE BLD STRIP.AUTO-MCNC: 116 MG/DL (ref 70–110)
HBA1C MFR BLD: 6.3 % (ref 4.5–5.6)
INR PPP: 1.7 (ref 0.8–1.2)
P-R INTERVAL, ECG05: 160 MS
PROTHROMBIN TIME: 19.3 SEC (ref 11.5–15.2)
Q-T INTERVAL, ECG07: 532 MS
QRS DURATION, ECG06: 154 MS
QTC CALCULATION (BEZET), ECG08: 557 MS
VENTRICULAR RATE, ECG03: 66 BPM

## 2017-04-17 PROCEDURE — 77010033678 HC OXYGEN DAILY

## 2017-04-17 PROCEDURE — 74011250636 HC RX REV CODE- 250/636: Performed by: INTERNAL MEDICINE

## 2017-04-17 PROCEDURE — 74011250637 HC RX REV CODE- 250/637: Performed by: INTERNAL MEDICINE

## 2017-04-17 PROCEDURE — 83036 HEMOGLOBIN GLYCOSYLATED A1C: CPT | Performed by: INTERNAL MEDICINE

## 2017-04-17 PROCEDURE — 82962 GLUCOSE BLOOD TEST: CPT

## 2017-04-17 PROCEDURE — 85610 PROTHROMBIN TIME: CPT | Performed by: INTERNAL MEDICINE

## 2017-04-17 PROCEDURE — 36415 COLL VENOUS BLD VENIPUNCTURE: CPT | Performed by: INTERNAL MEDICINE

## 2017-04-17 PROCEDURE — 65610000006 HC RM INTENSIVE CARE

## 2017-04-17 RX ORDER — INSULIN LISPRO 100 [IU]/ML
INJECTION, SOLUTION INTRAVENOUS; SUBCUTANEOUS
Status: DISCONTINUED | OUTPATIENT
Start: 2017-04-17 | End: 2017-04-20 | Stop reason: HOSPADM

## 2017-04-17 RX ORDER — METOPROLOL SUCCINATE 25 MG/1
25 TABLET, EXTENDED RELEASE ORAL DAILY
Status: DISCONTINUED | OUTPATIENT
Start: 2017-04-18 | End: 2017-04-20 | Stop reason: HOSPADM

## 2017-04-17 RX ORDER — ENOXAPARIN SODIUM 100 MG/ML
60 INJECTION SUBCUTANEOUS
Status: COMPLETED | OUTPATIENT
Start: 2017-04-17 | End: 2017-04-17

## 2017-04-17 RX ORDER — DEXTROSE 50 % IN WATER (D50W) INTRAVENOUS SYRINGE
25-50 AS NEEDED
Status: DISCONTINUED | OUTPATIENT
Start: 2017-04-17 | End: 2017-04-20 | Stop reason: HOSPADM

## 2017-04-17 RX ORDER — WARFARIN SODIUM 5 MG/1
5 TABLET ORAL ONCE
Status: COMPLETED | OUTPATIENT
Start: 2017-04-17 | End: 2017-04-17

## 2017-04-17 RX ORDER — MAGNESIUM SULFATE 100 %
4 CRYSTALS MISCELLANEOUS AS NEEDED
Status: DISCONTINUED | OUTPATIENT
Start: 2017-04-17 | End: 2017-04-20 | Stop reason: HOSPADM

## 2017-04-17 RX ADMIN — ENOXAPARIN SODIUM 60 MG: 60 INJECTION SUBCUTANEOUS at 17:15

## 2017-04-17 RX ADMIN — Medication 10 ML: at 14:44

## 2017-04-17 RX ADMIN — Medication 10 ML: at 06:10

## 2017-04-17 RX ADMIN — LOSARTAN POTASSIUM 25 MG: 50 TABLET ORAL at 08:44

## 2017-04-17 RX ADMIN — AMIODARONE HYDROCHLORIDE 400 MG: 200 TABLET ORAL at 06:10

## 2017-04-17 RX ADMIN — CARVEDILOL 25 MG: 12.5 TABLET, FILM COATED ORAL at 08:45

## 2017-04-17 RX ADMIN — FUROSEMIDE 40 MG: 40 TABLET ORAL at 08:45

## 2017-04-17 RX ADMIN — AMIODARONE HYDROCHLORIDE 400 MG: 200 TABLET ORAL at 21:52

## 2017-04-17 RX ADMIN — WARFARIN SODIUM 5 MG: 5 TABLET ORAL at 17:15

## 2017-04-17 RX ADMIN — AMIODARONE HYDROCHLORIDE 400 MG: 200 TABLET ORAL at 14:43

## 2017-04-17 RX ADMIN — Medication 10 ML: at 21:53

## 2017-04-17 NOTE — PROGRESS NOTES
Hospitalist Progress Note    Patient: Dorita Martinez MRN: [id number]  CSN: [id number]    YOB: 1955  Age: 64 y.o. Sex: female    DOA: 4/15/2017 LOS:  LOS: 2 days          Chief Complaint: Afib      Assessment/Plan     1. Acute Severe Respiratory Distress on admission. 2. A fib rvr with wide complex tachycardia on admission. 3. Acute on Chronic Systolic CHF. 4. Hx of Aortic Dissection repair   5. COPD. 6. Dyslipidemia. 7. Combined Metabolic/Resp Acidosis. 8.  BURAK poa. Improving. 9.  Hypoalbuminemia. Not malnutrition. 10.  Anticoagulation with Warfarin. 11.  Echo EF 10% on 4/15/17.    -Currently regular and rate controlled. HR in 80's. Feeling substantial improvement.  -Continue cardiac meds as prescribed by cardiology.  -Continue telemetry monitoring.  -Amiodarone loading underway. -Transfer to Telemetry [place] when/if ok with Cardiology.  -Out of bed and ambulate if ok with Cardiology. -DVT and GI px per unit protocol. Patient Active Problem List   Diagnosis Code    Acute respiratory failure ([facility]) J96.00    Pneumonia, organism unspecified J18.9    Acute on chronic systolic congestive heart failure ([facility]) I50.23    Respiratory failure, acute ([facility]) J96.00    PAF (paroxysmal atrial fibrillation) ([facility]) I48.0    Acute on chronic renal failure ([facility]) N17.9, N18.9    Pneumonia J18.9    New onset a-fib ([facility]) I48.91    CKD (chronic kidney disease) stage 3, GFR 30-59 ml/min N49.1    Systolic CHF, acute ([facility]) I50.21    Hypertension, essential I10    A-fib ([facility]) I48.91    Dehydration E86.0    Atrial fibrillation with RVR ([facility]) I48.91    Acute pulmonary edema ([facility]) J81.0    V-tach ([facility]) I47.2    Respiratory distress R06.00    COPD (chronic obstructive pulmonary disease) ([facility]) J44.9    History of aortic dissection Z86.79    HLD (hyperlipidemia) E78.5    Elevated brain natriuretic peptide (BNP) level R79.89       Subjective:    Seen and examined.   Admitted for respiratory distress and acute CHF against the backdrop of wide complex tachycardia and ultimately atrial fibrillation. Today feeling much better. No cough or sob. No chest pain. Eating breakfast.        Review of systems:    Constitutional: denies fevers, chills, myalgias  Respiratory: denies SOB, cough  Cardiovascular: denies chest pain, palpitations  Gastrointestinal: denies nausea, vomiting, diarrhea      Vital signs/Intake and Output:  Visit Vitals    /60 (BP 1 Location: Right arm, BP Patient Position: At rest)    Pulse 62    Temp 97.6 °F (36.4 °C)    Resp 13    Ht 5' 5\" (1.651 m)    Wt 65.1 kg (143 lb 8.3 oz)    SpO2 97%    BMI 23.88 kg/m2     Current Shift:     Last three shifts:  04/15 1901 - 04/17 0700  In: 863.8 [P.O.:480; I.V.:383.8]  Out: 2650 [Urine:2650]    Exam:    General: NAD  Head/Neck: MMM  CVS:S1S2 irr irr  Lungs:Ctab/l  Abdomen: Soft, bs+  Extremities: No edema.                   Labs: Results:       Chemistry Recent Labs      04/16/17 0430  04/15/17   0020   GLU  108*  291*   NA  140  144   K  3.6  4.9   CL  106  109*   CO2  22  18*   BUN  24*  20*   CREA  1.77*  2.30*   CA  8.7  8.7   AGAP  12  17   BUCR  14  9*   AP  75  113   TP  7.1  7.2   ALB  3.2*  3.2*   GLOB  3.9  4.0   AGRAT  0.8  0.8      CBC w/Diff Recent Labs      04/16/17   0430  04/15/17   0020   WBC  6.6  10.3   RBC  4.29  4.35   HGB  12.6  12.8   HCT  37.3  39.7   PLT  182  213   GRANS   --   43   LYMPH   --   48   EOS   --   1      Cardiac Enzymes Recent Labs      04/16/17   0430  04/15/17   0445   CPK  239*  201*   CKND1  1.0  2.8      Coagulation Recent Labs      04/17/17   0448  04/16/17   0430  04/15/17   0020   PTP  19.3*  19.7*  25.2*   INR  1.7*  1.8*  2.4*   APTT   --   40.0*  35.1       Lipid Panel Lab Results   Component Value Date/Time    Cholesterol, total 124 06/09/2016 03:55 AM    HDL Cholesterol 34 06/09/2016 03:55 AM    LDL, calculated 72.8 06/09/2016 03:55 AM    VLDL, calculated 17.2 06/09/2016 03:55 AM    Triglyceride 86 06/09/2016 03:55 AM    CHOL/HDL Ratio 3.6 06/09/2016 03:55 AM      BNP No results for input(s): BNPP in the last 72 hours.    Liver Enzymes Recent Labs      04/16/17   0430   TP  7.1   ALB  3.2*   AP  75   SGOT  31      Thyroid Studies Lab Results   Component Value Date/Time    TSH 1.17 04/15/2017 04:45 AM        Procedures/imaging: see electronic medical records for all procedures/Xrays and details which were not copied into this note but were reviewed prior to creation of Lanny Platt MD

## 2017-04-17 NOTE — INTERDISCIPLINARY ROUNDS
CRITICAL CARE INTERDISCIPLINARY ROUNDS    Patient Information:    Name:   Diego Garcia    Age:   64 y.o.     Admission Date:   4/15/2017    Critical Care Day: 3  Surgery Date:  N/a    Attending Provider:   Tati Wang MD    Surgeon:   n/a     Consultant(s):  [address] Physician:  Arvind Yadav    Code Status: Full Code    Problem List:     Patient Active Problem List   Diagnosis Code    Acute respiratory failure ([facility] J96.00    Pneumonia, organism unspecified J18.9    Acute on chronic systolic congestive heart failure ([facility] I50.23    Respiratory failure, acute ([facility] J96.00    PAF (paroxysmal atrial fibrillation) ([facility]) I48.0    Acute on chronic renal failure ([facility]) N17.9, N18.9    Pneumonia J18.9    New onset a-fib ([facility] I48.91    CKD (chronic kidney disease) stage 3, GFR 30-59 ml/min T20.0    Systolic CHF, acute ([facility]) I50.21    Hypertension, essential I10    A-fib ([facility] I48.91    Dehydration E86.0    Atrial fibrillation with RVR ([facility] [facility] I48.91    Acute pulmonary edema ([facility]) J81.0    V-tach ([facility]) I47.2    Respiratory distress R06.00    COPD (chronic obstructive pulmonary disease) ([facility] J44.9    History of aortic dissection Z86.79    HLD (hyperlipidemia) E78.5    Elevated brain natriuretic peptide (BNP) level R79.89       Principal Problem:  <principal problem not specified>    During rounds the following quality care indicators and evidence based practices were addressed :  DVT Prophylaxis, PUD Prophylaxis, Pain Management and Critical Care Interventions Airways, Drains and Lines Mcgarry Day none   Central Line Day:n/a     Ventilator Bundle:  Ventilator Bundle Order Set: n/a     Sepsis Order Set:   Glycemic Control:   Recent Labs      04/16/17   0430  04/15/17   0020   GLU  108*  291*   ;  Recent Labs      04/15/17   0210  04/15/17   0038   PHI  7.334*  7.169*   PCO2I  37.3  43.1   PO2I  66*  281*    Adjustments     Acute MI/PCI:   Not applicable    Door to Balloon: Admission Time: 0007 Heart Failure:    Not applicable     SCIP Measures for other Surgeries:   Not applicable CHG Bath Daily on All Ortho    Pneumonia:    Not applicable    Interdisciplinary team rounds were held with the following team membersDiabetes Treatment Specialist, Nursing, Nutrition, Pharmacy, Physician, Respiratory Therapy and Clinical Coordinator. Plan of care discussed. Goals of Care/ Recommendations: add BG d/c bipap change to n/c, A1c. See clinical pathway and/or care plan for interventions and desired outcomes.     Critical Care Discharge Status:  Red

## 2017-04-17 NOTE — PROGRESS NOTES
Cardiology Progress Note        Patient: Lucie Aldridge        Sex: female          DOA: 4/15/2017  YOB: 1955      Age:  64 y.o.        LOS:  LOS: 2 days    Patient seen and examined. Chart reviewed. Assessment/Plan     Patient Active Problem List   Diagnosis Code    Acute respiratory failure ([facility]) J96.00    Pneumonia, organism unspecified J18.9    Acute on chronic systolic congestive heart failure ([facility]) I50.23    Respiratory failure, acute ([facility]) J96.00         Acute on chronic renal failure ([facility]) N17.9, N18.9    Pneumonia J18.9    New onset a-fib ([facility]) I48.91    CKD (chronic kidney disease) stage 3, GFR 30-59 ml/min Q18.6    Systolic CHF, acute ([facility]) I50.21    Hypertension, essential I10    A-fib ([facility]) I48.91    Dehydration E86.0    Atrial fibrillation with RVR ([facility]) I48.91    Acute pulmonary edema ([facility]) J81.0         Respiratory distress R06.00    COPD (chronic obstructive pulmonary disease) ([facility]) J44.9    History of aortic dissection Z86.79    HLD (hyperlipidemia) E78.5    Elevated brain natriuretic peptide (BNP) level R79.89      Persistent atrial fibrillation   Hypotension    Plan:  Discontinue Coreg and Losartan. Continue Amiodarone  Will consider small dose of Toprol XL and restart Losartan as per BP response. INR is subtherapeutic will give dose of lovenox today and repeat PT/INR tomorrow    Continue management as per hospital medicine. Ok to transfer her out from ICU. Subjective:    cc:  Denies any chest pain or shortness of breath. Denies any dizziness      REVIEW OF SYSTEMS:     General: No fevers or chills. Cardiovascular: No chest pain,No palpitations, No orthopnea, No PND, No leg swelling, No claudication  Pulmonary: No dyspnea.    Gastrointestinal: No nausea, vomiting, bleeding  Neurology: No Dizziness    Objective:      Visit Vitals    /66    Pulse 60    Temp 97.5 °F (36.4 °C)    Resp 17    Ht 5' 5\" (1.651 m)    Wt 65.1 kg (143 lb 8.3 oz)    SpO2 98%    BMI 23.88 kg/m2     Body mass index is 23.88 kg/(m^2). Physical Exam:  General Appearance: Comfortable, not using accessory muscles of respiration. HEENT: SHANKAR. HEAD: Atraumatic  NECK: No JVD, no thyroidomeglay. CAROTIDS: No bruit  LUNGS: Clear bilaterally. HEART: S1+S2 audible, no murmur, no pericardial rub. ABD: Non-tender, BS Audible    EXT: No edema, and no cyanosis. VASCULAR EXAM: Pulses are intact. PSYCHIATRIC EXAM: Mood is appropriate. MUSCULOSKELETAL: Grossly no joint deformity. NEUROLOGICAL: AAO times 3, No motor and sensory deficit. Medication:  Current Facility-Administered Medications   Medication Dose Route Frequency    insulin lispro (HUMALOG) injection   SubCUTAneous AC&HS    glucose chewable tablet 16 g  4 Tab Oral PRN    glucagon (GLUCAGEN) injection 1 mg  1 mg IntraMUSCular PRN    dextrose (D50W) injection syrg 12.5-25 g  25-50 mL IntraVENous PRN    warfarin (COUMADIN) tablet 5 mg  5 mg Oral ONCE    amiodarone (CORDARONE) tablet 400 mg  400 mg Oral Q8H    furosemide (LASIX) tablet 40 mg  40 mg Oral DAILY    sodium chloride (NS) flush 5-10 mL  5-10 mL IntraVENous Q8H    sodium chloride (NS) flush 5-10 mL  5-10 mL IntraVENous PRN    hyaluronidase, human recomb.  (HYLENEX) 75 Units in sodium chloride 0.9 % 4.5 mL syringe   SubCUTAneous PRN    Warfarin- Rx to Dose & Monitor  1 Each Other Rx Dosing/Monitoring               Lab/Data Reviewed:       Recent Labs      04/16/17   0430  04/15/17   0020   WBC  6.6  10.3   HGB  12.6  12.8   HCT  37.3  39.7   PLT  182  213     Recent Labs      04/16/17   0430  04/15/17   0020   NA  140  144   K  3.6  4.9   CL  106  109*   CO2  22  18*   GLU  108*  291*   BUN  24*  20*   CREA  1.77*  2.30*   CA  8.7  8.7       Signed By: Mathew Perez MD     April 17, 2017

## 2017-04-17 NOTE — PROGRESS NOTES
conducted a Follow up consultation and Spiritual Assessment for Anaid Louie, who is a 64 y.o.,female. Son was at the bedside. No needs at this time but \"I want to go home. \"     The  provided the following Interventions:  Continued the relationship of care and support. Listened empathically. Offered assurance of continued prayer on patients behalf. Chart reviewed. The following outcomes were achieved:  Patient expressed gratitude for Spiritual Care visit. Assessment:  There are no further spiritual or Latter-day issues which require Spiritual Care Services intervention at this time. Plan:  Chaplains will continue to follow and will provide pastoral care as needed or requested.  recommends bedside caregivers page the  on duty if patient shows signs of acute spiritual or emotional distress. [address] [address].    Board Certified   [phone] - Office

## 2017-04-17 NOTE — DIABETES MGMT
GLYCEMIC CONTROL & NUTRITION:    - Discussed in rounds, known h/o of pre-dm, last A1C 6.4% from 2016  - recommend obtain new A1C (orders entered), pending  - recommend initiate POCT + Humalog corrective coverage, BG upon admission was >200 mg/dl  - noted diet orders in place for Consistent CHO, 2GM NA, FR 1000 ml       Recent Glucose Results:     Lab Results   Component Value Date/Time    Glucose 108 04/16/2017 04:30 AM    Glucose (POC) 101 04/17/2017 12:03 PM     Lab Results   Component Value Date/Time    Hemoglobin A1c 6.4 06/09/2016 03:55 AM             Biju Cox, MPH, RD

## 2017-04-17 NOTE — WOUND CARE
Pt seen by wound care for consult for IV extravasation. Area is healed and pt states this event occurred over a year ago. No wound care needs at this time.

## 2017-04-17 NOTE — PROGRESS NOTES
1930 hrs. Report received. Initial assessment completed. Afebrile. Monitoring in SR with BBB. Denies pain. Eating well. O2 2l/min via NC . Lung sounds clear /diminished. Resting Quietly watching TV.    2350 hrs. Reassessment completed. Condition stable. 0400 hrs. Uneventful night. 0720 hrs Bedside and Verbal shift change report given to Jen Alcaraz (oncoming nurse) by Archie Adams (offgoing nurse). Report included the following information SBAR, Kardex, Intake/Output and Recent Results.

## 2017-04-17 NOTE — PROGRESS NOTES
Readmission Risk Assessment:     High Risk and [facility] A[facility] patients    RRAT Score:  21 or greater    Initial Assessment: Chart reviewed and noted Pt currently in ICU for acute resp failure, afib with RVR. CM following for needs at time of dc. Pt may tx to tele soon. Emergency Contact:  See facesheet    Pertinent Medical Hx:  EF 10% , afib, CKD, HTN, PNA, CHF    PCP/Specialists:       Community Services:  unknown    DME:         High Risk Care Transition Plan:  1. Evaluate for [facility] or [facility], SNF, acute rehab, community care coordination of Resources. 2. Involve patient/caregiver in assessment, planning, education and implement of intervention. 3. CM daily patient care huddles/interdisciplinary rounds. 4. PCP/Specialist appointment within 48 hours of discharge unless otherwise specified. 5. Facilitate transportation and logistics for follow-up appointments. 6. Medication reconciliation [address]  7. Discharge follow-up phone call within 2  4 days (NN, [facility], Nursing) CM follow up as assigned. 8. Formal handoff between hospital provider and post-acute provider to transition patient  9. Handoff to [facility] Nurse Navigator or PCP practice.

## 2017-04-17 NOTE — PROGRESS NOTES
INITIAL NUTRITION ASSESSMENT     RECOMMENDATIONS/PLAN:   - Consistent Carb 2GM NA 1000 ML FR diet  - Monitor POC BG and check HbA1c due to hyperglycemia with h/o preDM    REASON FOR ASSESSMENT:   [x]  RN Referral:    [x] Pressure Ulcer/Wound Care needs    NUTRITION ASSESSMENT:   Client History: 64 yrs old Female admitted with acute respiratory distress, acute CHF exacerbation, BURAK (improving), and metabolic/respiratory acidosis. Eating well since adm, % of meals. Noted hyperglycemia present upon adm 291 mg/dl, noted HbA1c in 2016 elevated at 6.4%, recommend consistent carb diet while inpatient with additional fluid and sodium restrictions per cardiology for CHF. PMHx: COPD, preDM, Afib, aortic dissection, CHF, HLD, HTN, CKD stage 3   Cultural/Sikhism Food Preferences: None Identified    FOOD/NUTRITION HISTORY  Diet History: no appetite changes PTA   Food Allergies:  [x] NKFA     [] Yes    Pertinent PTA Medications: zocor, lasix, coumadin     NUTRITION INTAKE   Diet Order:  Cardiac 2GM NA 1000 ML FR      Average PO Intake:       Patient Vitals for the past 100 hrs:   % Diet Eaten   04/16/17 1730 100 %   04/15/17 2000 75 %      Pertinent Medications:  [x] Reviewed; lasix, warfarin  Insulin:  [x] SSI  [] Pre-meal   []  Basal   [] Drip  [] None  Pt expected to meet estimated nutrient needs through next review:          [x]  Yes     [] No;  ANTHROPOMETRICS  Height: 5' 5\" (165.1 cm)       Weight: 65.1 kg (143 lb 8.3 oz)    BMI: 23.9 kg/m^2  -  normal weight (18.5%-24.9% BMI)        Weight change: -11# (7%) x9 months is non-severe wt loss                                  Comparison to Reference Standards:  IBW: 125 lbs      %IBW: 115%      AdjBW: N/A    NUTRITION-FOCUSED PHYSICAL ASSESSMENT  Skin: intact aside from IV infiltrate- wound care consulted.      GI: no c/o N/V    BIOCHEMICAL DATA & MEDICAL TESTS  Pertinent Labs:  [x] Reviewed; Glucose 108-291, HbA1c 6.4% (2016), BUN 24, GFR 35, Alb 3.2     NUTRITION PRESCRIPTION  Calories: 5646-9256 kcal/day based on 25-30 kcal/kg  Protein: 52-65 g/day based on 0.8-1 g/kg  CHO: 203 g/day based on 50% of total energy  Fluid: 6284-6946 ml/day based on 1 kcal/ml      NUTRITION DIAGNOSES:   1. Altered nutrition related lab values related to preDM as evidenced by glucose 108-291 mg/dl, HbA1c 6.4% in 06/2016. NUTRITION INTERVENTIONS:   INTERVENTIONS:        GOALS:  1. Consistent carb diet, poct, ssi 1. >50% PO intake of meals, maintain weight, POC -180 mg/dl by next review 3-5 days     LEARNING NEEDS (Diet, Supplementation, Food/Nutrient-Drug Interaction):   [] None Identified  [] Inpatient education provided/documented    [x] Identified and patient:  [] Declined     [x] Was not appropriate/indicated  NUTRITION MONITORING /EVALUATION:   Follow PO intake  Monitor wt  Monitor renal labs, electrolytes, fluid status    [x] Participated in Interdisciplinary Rounds  [x] [facility] Reviewed/Documented  DISCHARGE NUTRITION RECOMMENDATIONS ADDRESSED:     [x] Yes- recommended consistent carb low na diet     NUTRITION RISK:     [x]  At risk                     []  Not currently at risk     Will follow-up per policy.   Mily Kelsey RD  PAGER:  [phone]

## 2017-04-17 NOTE — PROGRESS NOTES
Cardiology Progress Note        Patient: Angleurah Schirmer        Sex: female          DOA: 4/15/2017  YOB: 1955      Age:  64 y.o.        LOS:  LOS: 1 day    Patient seen and examined. Chart reviewed. Assessment/Plan     Patient Active Problem List   Diagnosis Code    Acute respiratory failure ([facility]) J96.00    Pneumonia, organism unspecified J18.9    Acute on chronic systolic congestive heart failure ([facility]) I50.23    Respiratory failure, acute ([facility]) J96.00    Acute on chronic renal failure ([facility]) N17.9, N18.9    Pneumonia J18.9    CKD (chronic kidney disease) stage 3, GFR 30-59 ml/min D01.9    Systolic CHF, acute ([facility]) I50.21    Hypertension, essential I10    A-fib ([facility]) I48.91    Dehydration E86.0    Atrial fibrillation with RVR ([facility]) I48.91    Acute pulmonary edema ([facility]) J81.0    Respiratory distress R06.00    COPD (chronic obstructive pulmonary disease) ([facility]) J44.9    History of aortic dissection Z86.79    HLD (hyperlipidemia) E78.5    Elevated brain natriuretic peptide (BNP) level R79.89      Abnormal troponin no evidence of ACS  Persistent atrial fibrillation currently in sinus rhythm    Plan:    Discontinue amiodarone drip and start amiodarone by mouth 400 mg every 8 hours. Start Coreg 25 mg twice a day, losartan 25 mg once a day and Lasix 40 mg once a day. Continue Coumadin as per PT/INR. INR goal 2-3. Continue management as per hospital medicine. Discontinue BiPAP and put her on nasal cannula. Subjective:    cc:  Denies any chest pain or shortness of breath. Denies any orthopnea or PND. REVIEW OF SYSTEMS:     General: No fevers or chills. Cardiovascular: No chest pain,No palpitations, No orthopnea, No PND, No leg swelling, No claudication  Pulmonary: No dyspnea.    Gastrointestinal: No nausea, vomiting, bleeding  Neurology: No Dizziness    Objective:      Visit Vitals    /82    Pulse 73    Temp 97.8 °F (36.6 °C)    Resp 21    Ht 5' 5\" (1.651 m)    Wt 68.3 kg (150 lb 9.2 oz)    SpO2 99%    BMI 25.06 kg/m2     Body mass index is 25.06 kg/(m^2). Physical Exam:  General Appearance: Comfortable, not using accessory muscles of respiration. HEENT: SHANKAR. HEAD: Atraumatic  NECK: No JVD, no thyroidomeglay. CAROTIDS: no bruit  LUNGS: Clear bilaterally. Chest: Midline scar  HEART: S1+S2 audible, no murmur, no pericardial rub. ABD: Non-tender, BS Audible    EXT: No edema, and no cyanosis. VASCULAR EXAM: Pulses are intact. PSYCHIATRIC EXAM: Mood is appropriate. MUSCULOSKELETAL: Grossly no joint deformity. NEUROLOGICAL: AAO ×3, No motor and sensory deficit  Medication:  Current Facility-Administered Medications   Medication Dose Route Frequency    amiodarone (CORDARONE) tablet 400 mg  400 mg Oral Q8H    [START ON 4/17/2017] losartan (COZAAR) tablet 25 mg  25 mg Oral DAILY    carvedilol (COREG) tablet 25 mg  25 mg Oral BID WITH MEALS    furosemide (LASIX) tablet 40 mg  40 mg Oral DAILY    sodium chloride (NS) flush 5-10 mL  5-10 mL IntraVENous Q8H    sodium chloride (NS) flush 5-10 mL  5-10 mL IntraVENous PRN    hyaluronidase, human recomb.  (HYLENEX) 75 Units in sodium chloride 0.9 % 4.5 mL syringe   SubCUTAneous PRN    Warfarin- Rx to Dose & Monitor  1 Each Other Rx Dosing/Monitoring               Lab/Data Reviewed:       Recent Labs      04/16/17   0430  04/15/17   0020   WBC  6.6  10.3   HGB  12.6  12.8   HCT  37.3  39.7   PLT  182  213     Recent Labs      04/16/17   0430  04/15/17   0020   NA  140  144   K  3.6  4.9   CL  106  109*   CO2  22  18*   GLU  108*  291*   BUN  24*  20*   CREA  1.77*  2.30*   CA  8.7  8.7       Signed By: Xochitl Bhandari MD     April 16, 2017

## 2017-04-17 NOTE — PROGRESS NOTES
Pharmacy Dosing Services: Warfarin    Consult for Warfarin Dosing by Pharmacy by Dr. Samy Hernandez provided for this 64 y.o.  female , for indication of Atrial Fibrillation. Home regimen: Warfarin 5 mg po nightly  Dose to achieve an INR goal of 2-3    Order entered for Warfarin 5 mg to be given today at 18:00. Significant drug interactions:amiodarone  LABS    PT/INR Lab Results   Component Value Date/Time    INR 1.7 04/17/2017 04:48 AM      Platelets Lab Results   Component Value Date/Time    PLATELET 075 91/93/9457 04:30 AM      H/H     Lab Results   Component Value Date/Time    HGB 12.6 04/16/2017 04:30 AM        Warfarin Administrations (last 168 hours)       Date/Time Action Medication Dose      04/16/17 1755 Given    warfarin (COUMADIN) tablet 7.5 mg 7.5 mg    04/15/17 1724 Given    warfarin (COUMADIN) tablet 5 mg 5 mg          Pharmacy to follow daily and will provide subsequent Warfarin dosing based on clinical status.   Arch MÓNICA Grissom  Contact information [phone]

## 2017-04-18 LAB
ALBUMIN SERPL BCP-MCNC: 3.2 G/DL (ref 3.4–5)
ALBUMIN/GLOB SERPL: 0.8 {RATIO} (ref 0.8–1.7)
ALP SERPL-CCNC: 71 U/L (ref 45–117)
ALT SERPL-CCNC: 31 U/L (ref 13–56)
ANION GAP BLD CALC-SCNC: 10 MMOL/L (ref 3–18)
AST SERPL W P-5'-P-CCNC: 19 U/L (ref 15–37)
BILIRUB DIRECT SERPL-MCNC: 0.1 MG/DL (ref 0–0.2)
BILIRUB SERPL-MCNC: 0.4 MG/DL (ref 0.2–1)
BUN SERPL-MCNC: 37 MG/DL (ref 7–18)
BUN/CREAT SERPL: 17 (ref 12–20)
CALCIUM SERPL-MCNC: 9 MG/DL (ref 8.5–10.1)
CHLORIDE SERPL-SCNC: 103 MMOL/L (ref 100–108)
CO2 SERPL-SCNC: 26 MMOL/L (ref 21–32)
CREAT SERPL-MCNC: 2.14 MG/DL (ref 0.6–1.3)
GLOBULIN SER CALC-MCNC: 3.8 G/DL (ref 2–4)
GLUCOSE BLD STRIP.AUTO-MCNC: 103 MG/DL (ref 70–110)
GLUCOSE BLD STRIP.AUTO-MCNC: 104 MG/DL (ref 70–110)
GLUCOSE BLD STRIP.AUTO-MCNC: 107 MG/DL (ref 70–110)
GLUCOSE BLD STRIP.AUTO-MCNC: 85 MG/DL (ref 70–110)
GLUCOSE SERPL-MCNC: 93 MG/DL (ref 74–99)
HCT VFR BLD AUTO: 37.9 % (ref 35–45)
HGB BLD-MCNC: 12.7 G/DL (ref 12–16)
INR PPP: 2.1 (ref 0.8–1.2)
MAGNESIUM SERPL-MCNC: 1.9 MG/DL (ref 1.6–2.6)
PLATELET # BLD AUTO: 185 K/UL (ref 135–420)
POTASSIUM SERPL-SCNC: 4 MMOL/L (ref 3.5–5.5)
PROT SERPL-MCNC: 7 G/DL (ref 6.4–8.2)
PROTHROMBIN TIME: 22.3 SEC (ref 11.5–15.2)
SODIUM SERPL-SCNC: 139 MMOL/L (ref 136–145)

## 2017-04-18 PROCEDURE — 65660000000 HC RM CCU STEPDOWN

## 2017-04-18 PROCEDURE — 82962 GLUCOSE BLOOD TEST: CPT

## 2017-04-18 PROCEDURE — 97161 PT EVAL LOW COMPLEX 20 MIN: CPT

## 2017-04-18 PROCEDURE — 74011250637 HC RX REV CODE- 250/637: Performed by: INTERNAL MEDICINE

## 2017-04-18 PROCEDURE — 85610 PROTHROMBIN TIME: CPT | Performed by: INTERNAL MEDICINE

## 2017-04-18 PROCEDURE — 80048 BASIC METABOLIC PNL TOTAL CA: CPT | Performed by: INTERNAL MEDICINE

## 2017-04-18 PROCEDURE — 97165 OT EVAL LOW COMPLEX 30 MIN: CPT

## 2017-04-18 PROCEDURE — 83735 ASSAY OF MAGNESIUM: CPT | Performed by: INTERNAL MEDICINE

## 2017-04-18 PROCEDURE — 80076 HEPATIC FUNCTION PANEL: CPT | Performed by: INTERNAL MEDICINE

## 2017-04-18 PROCEDURE — 36415 COLL VENOUS BLD VENIPUNCTURE: CPT | Performed by: INTERNAL MEDICINE

## 2017-04-18 PROCEDURE — 85049 AUTOMATED PLATELET COUNT: CPT | Performed by: INTERNAL MEDICINE

## 2017-04-18 PROCEDURE — 85018 HEMOGLOBIN: CPT | Performed by: INTERNAL MEDICINE

## 2017-04-18 RX ORDER — FUROSEMIDE 20 MG/1
20 TABLET ORAL DAILY
Status: DISCONTINUED | OUTPATIENT
Start: 2017-04-19 | End: 2017-04-20 | Stop reason: HOSPADM

## 2017-04-18 RX ORDER — WARFARIN 3 MG/1
3 TABLET ORAL ONCE
Status: COMPLETED | OUTPATIENT
Start: 2017-04-18 | End: 2017-04-18

## 2017-04-18 RX ORDER — LOSARTAN POTASSIUM 25 MG/1
25 TABLET ORAL DAILY
Status: DISCONTINUED | OUTPATIENT
Start: 2017-04-18 | End: 2017-04-20 | Stop reason: HOSPADM

## 2017-04-18 RX ADMIN — METOPROLOL SUCCINATE 25 MG: 25 TABLET, EXTENDED RELEASE ORAL at 08:56

## 2017-04-18 RX ADMIN — Medication 10 ML: at 06:33

## 2017-04-18 RX ADMIN — Medication 10 ML: at 21:54

## 2017-04-18 RX ADMIN — AMIODARONE HYDROCHLORIDE 200 MG: 200 TABLET ORAL at 21:53

## 2017-04-18 RX ADMIN — AMIODARONE HYDROCHLORIDE 400 MG: 200 TABLET ORAL at 06:17

## 2017-04-18 RX ADMIN — Medication 10 ML: at 15:14

## 2017-04-18 RX ADMIN — AMIODARONE HYDROCHLORIDE 400 MG: 200 TABLET ORAL at 15:13

## 2017-04-18 RX ADMIN — LOSARTAN POTASSIUM 25 MG: 25 TABLET ORAL at 11:32

## 2017-04-18 RX ADMIN — FUROSEMIDE 40 MG: 40 TABLET ORAL at 08:56

## 2017-04-18 RX ADMIN — WARFARIN SODIUM 3 MG: 3 TABLET ORAL at 18:01

## 2017-04-18 NOTE — ROUTINE PROCESS
Bedside and Verbal shift change report given to Faiza Haji RN (oncoming nurse) by Isabel Valles RN (offgoing nurse). Report included the following information SBAR, Kardex, Intake/Output, MAR, Recent Results, Med Rec Status and Cardiac Rhythm SBc BBB.

## 2017-04-18 NOTE — ROUTINE PROCESS
Bedside and Verbal shift change report given to Mary Parker RN (oncoming nurse) by Ángel Stewart RN (offgoing nurse). Report included the following information SBAR, Kardex, Intake/Output, MAR, Recent Results, Med Rec Status and Cardiac Rhythm SR c BBB.

## 2017-04-18 NOTE — PROGRESS NOTES
Problem: Mobility Impaired (Adult and Pediatric)  Goal: *Acute Goals and Plan of Care (Insert Text)  Patient is independent with all mobility on day of evaluation. No acute PT needs identified. Outcome: Resolved/Met Date Met:  04/18/17  PHYSICAL THERAPY EVALUATION & DISCHARGE     Patient: Nav Nava (32 y.o. female)  Date: 4/18/2017  Primary Diagnosis: Respiratory distress  V-tach (HCC)  Acute pulmonary edema (HCC)  Atrial fibrillation with RVR (HCC)  Acute respiratory failure (HCC)        Precautions:   Fall      ASSESSMENT AND RECOMMENDATIONS:  Based on the objective data described below, the patient presents to be at her functional baseline. The patient demonstrates safety and independence with all mobility, ambulating 400+ feet with independence. O2 saturations remained above 96% while ambulating on room air. No loss of balance or signs or symptoms of dyspnea occurred. Patient was returned to her room, seated in a chair. No acute PT needs identified. Will discharge from PT caseload. Skilled physical therapy is not indicated at this time. Discharge Recommendations: None  Further Equipment Recommendations for Discharge: N/A        SUBJECTIVE:   Patient stated I don't think I need y'all.       OBJECTIVE DATA SUMMARY:       Past Medical History:   Diagnosis Date    A-fib [facility])      Aortic dissection ([facility])      COPD (chronic obstructive pulmonary disease) ([facility])      Heart failure (HCC)      High cholesterol      Hypertension       Past Surgical History:   Procedure Laterality Date    CARDIAC SURG PROCEDURE UNLIST         Barriers to Learning/Limitations: None  Compensate with: visual, verbal, tactile, kinesthetic cues/model  Prior Level of Function/Home Situation: Patient reports being independent with all mobility prior to admission.   Home Situation  Home Environment: Private residence  # Steps to Enter: 0  One/Two Story Residence: Two story  # of Interior Steps: 14  Interior Rails: Right  Living Alone: No  Support Systems: Friends \ neighbors, Child(lady)  Patient Expects to be Discharged to[de-identified] Private residence  Current DME Used/Available at Home: Grab bars  Tub or Shower Type: Shower  Critical Behavior:  Neurologic State: Alert  Orientation Level: Oriented X4  Cognition: Follows commands  Safety/Judgement: Fall prevention  Psychosocial  Patient Behaviors: Calm; Cooperative  Purposeful Interaction: Yes  Strength:    Strength: Within functional limits  Tone & Sensation:   Tone: Normal  Sensation: Intact     Range Of Motion:  AROM: Within functional limits  PROM: Within functional limits  Functional Mobility:  Bed Mobility:  Supine to Sit: Supervision  Transfers:  Sit to Stand: Supervision  Stand to Sit: Supervision  Balance:   Sitting: Intact  Standing: Intact  Ambulation/Gait Training:  Distance (ft): 400 Feet (ft)  Assistive Device: Gait belt  Ambulation - Level of Assistance: Independent;Supervision  Gait Description (WDL): Exceptions to WDL  Gait Abnormalities: Decreased step clearance     Pain:  Pain Scale 1: Numeric (0 - 10)  Pain Intensity 1: 0  Activity Tolerance:   Good  Please refer to the flowsheet for vital signs taken during this treatment. After treatment:   [ ]         Patient left in no apparent distress sitting up in chair  [X]         Patient left in no apparent distress in bed  [X]         Call bell left within reach  [X]         Nursing notified  [ ]         Caregiver present  [ ]         Bed alarm activated      COMMUNICATION/EDUCATION:   [X]         Fall prevention education was provided and the patient/caregiver indicated understanding. [X]         Patient/family have participated as able in goal setting and plan of care. [X]         Patient/family agree to work toward stated goals and plan of care. [ ]         Patient understands intent and goals of therapy, but is neutral about his/her participation. [ ]         Patient is unable to participate in goal setting and plan of care. Thank you for this referral.  Alycia Codding   Time Calculation: 13 mins     Eval Complexity: History: MEDIUM  Complexity : 1-2 comorbidities / personal factors will impact the outcome/ POC Exam:MEDIUM Complexity : 3 Standardized tests and measures addressing body structure, function, activity limitation and / or participation in recreation  Presentation: LOW Complexity : Stable, uncomplicated  Clinical Decision Making:Low Complexity   Overall Complexity:LOW      Mobility  Current  CI= 1-19%   Goal  CI= 1-19%  D/C  CI= 1-19%. The severity rating is based on the Level of Assistance required for Functional Mobility and ADLs.

## 2017-04-18 NOTE — DIABETES MGMT
GLYCEMIC CONTROL & NUTRITION:    - Discussed in rounds, known h/o of pre-dm, current A1C 6.3%  - recommend continue POCT + Humalog corrective coverage, BG upon admission was >200 mg/dl  - noted diet orders in place for Consistent CHO, 2GM NA, FR 1000 ml  - Intake:   Patient Vitals for the past 100 hrs:   % Diet Eaten   04/18/17 0830 80 %   04/17/17 1929 90 %   04/17/17 1300 100 %   04/16/17 1730 100 %   04/15/17 2000 75 %         Recent Glucose Results:     Lab Results   Component Value Date/Time    Glucose 93 04/18/2017 05:00 AM    Glucose (POC) 104 04/18/2017 11:34 AM     Lab Results   Component Value Date/Time    Hemoglobin A1c 6.3 04/17/2017 04:48 AM         PEPE Bermudez, MPH, RD

## 2017-04-18 NOTE — PROGRESS NOTES
1125--Paged Dr. Roque Overcast and advised him of patient hypotension 91/39(53) and 84/46(55). It's believed to be medication induced. Patient is asymptomatic at this time and orders were received to discontinue coreg and losartan. Also gave orders to put the patient's feet up until BP improves and if no improvement, give 250ml NS bolus. 1145--BP has improved (see flowsheets) and no need for NS bolus. 1930--Bedside and Verbal shift change report given to Lior Perdomo RN (oncoming nurse) by Gloria Sena RN (offgoing nurse). Report included the following information SBAR, Kardex, Intake/Output, MAR, Recent Results, Med Rec Status and Cardiac Rhythm SR c BBB.       **SHIFT SUMMARY**  Shift was pretty uneventful minus some hypotension. BP remained stable without any further intervention from me and a medication reduction. No c/o from patient during my shift.

## 2017-04-18 NOTE — INTERDISCIPLINARY ROUNDS
CRITICAL CARE INTERDISCIPLINARY ROUNDS    Patient Information:    Name:   Momo Maria    Age:   64 y.o. Admission Date:   4/15/2017    Critical Care Day: 4    Surgery Date:  N/a    Attending Provider:   Viridiana Leigh MD    Surgeon:   n/a     Consultant(s):  [id number] Physician:  Carmelita Urbina    Code Status: Full Code    Problem List:     Patient Active Problem List   Diagnosis Code    Acute respiratory failure ([facility] J96.00    Pneumonia, organism unspecified J18.9    Acute on chronic systolic congestive heart failure ([facility] I50.23    Respiratory failure, acute ([facility] J96.00    PAF (paroxysmal atrial fibrillation) ([facility]) I48.0    Acute on chronic renal failure ([facility]) N17.9, N18.9    Pneumonia J18.9    New onset a-fib ([facility] I48.91    CKD (chronic kidney disease) stage 3, GFR 30-59 ml/min C91.2    Systolic CHF, acute ([facility]) I50.21    Hypertension, essential I10    A-fib ([address] I48.91    Dehydration E86.0    Atrial fibrillation with RVR ([facility] I48.91    Acute pulmonary edema ([facility]) J81.0    V-tach ([facility]) I47.2    Respiratory distress R06.00    COPD (chronic obstructive pulmonary disease) ([facility] J44.9    History of aortic dissection Z86.79    HLD (hyperlipidemia) E78.5    Elevated brain natriuretic peptide (BNP) level R79.89       Principal Problem:  <principal problem not specified>    During rounds the following quality care indicators and evidence based practices were addressed :  DVT Prophylaxis,Pain Management and Critical Care Interventions Airways, Drains and Lines Mcgarry Day none   Central Line Day:n/a     Ventilator Bundle:  Ventilator Bundle Order Set: n/a     Sepsis Order Set:   Glycemic Control:   Recent Labs      04/18/17   0500  04/16/17   0430   GLU  93  108*   ; No results for input(s): PHI, PCO2I, PO2I in the last 72 hours.     No lab exists for component: 3 Adjustments     Acute MI/PCI:   Not applicable    Door to Balloon: Admission Time: 0007      Heart Failure:    Not applicable     SCIP Measures for other Surgeries:   Not applicable CHG Bath Daily on All Ortho    Pneumonia:    Not applicable    Interdisciplinary team rounds were held with the following team membersDiabetes Treatment Specialist, Nursing, Nutrition, Pharmacy, Physician, Respiratory Therapy and Clinical Coordinator. Plan of care discussed. Goals of Care/ Recommendations: transfer to floor. See clinical pathway and/or care plan for interventions and desired outcomes.     Critical Care Discharge Status:  [facility], [place] - [facility]

## 2017-04-18 NOTE — PROGRESS NOTES
**Shift Summary**    Shift uneventful today. Patient worked with PT and was able to ambulate in hallways on RA without incident. Patient sat in chair second half of shift. Patient has transfer orders to Tele. On hold to give report at this time. Patient to transfer to room 340.

## 2017-04-18 NOTE — PROGRESS NOTES
1930: Bedside and Verbal shift change report received from Gelacio Alvarez RN. Report included the following information SBAR, Intake/Output, MAR, Recent Results, Cardiac Rhythm NSR(SB), BBB and Alarm Parameters . 2000: Assessment complete, see flow sheets. 0000: Reassessment, no change. 0400: Reassessment, no change. 0720: Bedside and Verbal shift change report given to EDDI Weinberg RN (oncoming nurse) by Kayode Harrell RN   (offgoing nurse). Report included the following information SBAR, Intake/Output, MAR, Recent Results, Cardiac Rhythm NSR with BBB and Alarm Parameters .

## 2017-04-18 NOTE — PROGRESS NOTES
Pharmacy Dosing Services: Warfarin    Consult for Warfarin Dosing by Pharmacy by Dr. Colette Gardner provided for this 64 y.o.  female , for indication of Atrial Fibrillation. Day of Therapy 4  Dose to achieve an INR goal of 2-3    INR increased from 1.7 to 2.1 (within 24 hours)  Order entered for Warfarin 3 mg to be given today at 18:00. Significant drug interactions: amiodarone  LABS    PT/INR Lab Results   Component Value Date/Time    INR 2.1 04/18/2017 05:00 AM      Platelets Lab Results   Component Value Date/Time    PLATELET 111 53/30/9140 05:00 AM      H/H     Lab Results   Component Value Date/Time    HGB 12.7 04/18/2017 05:00 AM        Warfarin Administrations (last 168 hours)       Date/Time Action Medication Dose      04/17/17 1715 Given    warfarin (COUMADIN) tablet 5 mg 5 mg    04/16/17 1755 Given    warfarin (COUMADIN) tablet 7.5 mg 7.5 mg    04/15/17 1724 Given    warfarin (COUMADIN) tablet 5 mg 5 mg          Pharmacy to follow daily and will provide subsequent Warfarin dosing based on clinical status.   Dorothy Bhatt, [address]  Contact information [phone]

## 2017-04-18 NOTE — PROGRESS NOTES
Problem: Self Care Deficits Care Plan (Adult)  Goal: *Acute Goals and Plan of Care (Insert Text)  Outcome: Resolved/Met Date Met:  04/18/17  OCCUPATIONAL THERAPY EVALUATION/DISCHARGE     Patient: Minerva Arredondo (8 y.o. female)  Date: 4/18/2017  Primary Diagnosis: Respiratory distress  V-tach (HCC)  Acute pulmonary edema (HCC)  Atrial fibrillation with RVR (HCC)  Acute respiratory failure (HCC)        Precautions:   Fall      ASSESSMENT AND RECOMMENDATIONS:  Based on the objective data described below, the patient presents with ability to perform ADL tasks at baseline level. Pt supine in bed upon entering, agreeable to therapy. Pt found on 3L of supplemental O2. Pt completed supine to sit transfer with supervision. While seated EOB, pt demonstrated ability to don/doff socks with supervision. Pt donned gown like robe with supervision. Supplemental O2 removed. Pt completed sit to stand transfer and functional mobility with supervision. SpO2 remained >97% throughout session. Pt with no SOB noted. Pt left seated in chair with needs in reach. Per RN, ok to leave pt on room air. Pt with no further OT/ADL concerns at this time. Skilled occupational therapy is not indicated at this time. Education: Role of OT in acute care, plan of care     Discharge Recommendations: None  Further Equipment Recommendations for Discharge: N/A        SUBJECTIVE:   Patient stated I don't think I need it.       OBJECTIVE DATA SUMMARY:       Past Medical History:   Diagnosis Date    A-fib [facility])      Aortic dissection (HCC)      COPD (chronic obstructive pulmonary disease) (HCC)      Heart failure (HCC)      High cholesterol      Hypertension       Past Surgical History:   Procedure Laterality Date    CARDIAC SURG PROCEDURE UNLIST         Barriers to Learning/Limitations: None  Compensate with: visual, verbal, tactile, kinesthetic cues/model  GCODES(GO):Self Care  Current  CI= 1-19%   Goal  CI= 1-19%   D/C  CI= 1-19%.  The severity rating is based on the Other modified barthel index  Prior Level of Function/Home Situation: I with ADLs  Home Situation  Home Environment: Private residence  # Steps to Enter: 0  One/Two Story Residence: Two story  # of Interior Steps: 15  Interior Rails: Right  Living Alone: No  Support Systems: Friends \ neighbors, Child(lady)  Patient Expects to be Discharged to[de-identified] Private residence  Current DME Used/Available at Home: Grab bars  Tub or Shower Type: Shower     Cognitive/Behavioral Status:  Neurologic State: Alert  Orientation Level: Oriented X4  Cognition: Follows commands  Safety/Judgement: Fall prevention  Coordination:  Coordination: Within functional limits  Fine Motor Skills-Upper: Left Intact; Right Intact    Gross Motor Skills-Upper: Left Intact; Right Intact  Balance:  Sitting: Intact  Standing: Intact  Strength:  Strength: Within functional limits  Tone & Sensation:  Tone: Normal  Sensation: Intact     Range of Motion:  AROM: Within functional limits  PROM: Within functional limits     Functional Mobility and Transfers for ADLs:  Bed Mobility:  Supine to Sit: Supervision     Transfers:  Sit to Stand: Supervision              Bathroom Mobility: Supervision/set up (simulated)  ADL Assessment:  Upper Body Dressing: Supervision     Lower Body Dressing: Supervision     ADL Intervention:  Upper Body Dressing Assistance  Dressing Assistance: Supervision/set-up  Shirt simulation with hospital gown: Supervision/set-up     Lower Body Dressing Assistance  Dressing Assistance: Supervision/set up  Socks: Supervision/set-up  Leg Crossed Method Used: Yes  Position Performed: Seated edge of bed     Cognitive Retraining  Safety/Judgement: Fall prevention     Pain:  Pre-treatment: 0  Post-treatment: 0  Activity Tolerance:   good  Please refer to the flowsheet for vital signs taken during this treatment.   After treatment:   [X]  Patient left in no apparent distress sitting up in chair  [ ]  Patient left in no apparent distress in bed  [X]  Call bell left within reach  [X]  Nursing notified  [ ]  Caregiver present  [ ]  Bed alarm activated      COMMUNICATION/EDUCATION:   Communication/Collaboration:  [X]      Home safety education was provided and the patient/caregiver indicated understanding. [X]      Patient/family have participated as able and agree with findings and recommendations. [ ]      Patient is unable to participate in plan of care at this time.      Christine Jiang MS OTR/L  Time Calculation: 13 mins

## 2017-04-19 PROBLEM — I47.20 V-TACH: Status: RESOLVED | Noted: 2017-04-15 | Resolved: 2017-04-19

## 2017-04-19 PROBLEM — J81.0 ACUTE PULMONARY EDEMA (HCC): Status: RESOLVED | Noted: 2017-04-15 | Resolved: 2017-04-19

## 2017-04-19 PROBLEM — R06.03 RESPIRATORY DISTRESS: Status: RESOLVED | Noted: 2017-04-15 | Resolved: 2017-04-19

## 2017-04-19 LAB
GLUCOSE BLD STRIP.AUTO-MCNC: 102 MG/DL (ref 70–110)
GLUCOSE BLD STRIP.AUTO-MCNC: 102 MG/DL (ref 70–110)
GLUCOSE BLD STRIP.AUTO-MCNC: 107 MG/DL (ref 70–110)
GLUCOSE BLD STRIP.AUTO-MCNC: 92 MG/DL (ref 70–110)
INR PPP: 2.1 (ref 0.8–1.2)
MAGNESIUM SERPL-MCNC: 2 MG/DL (ref 1.6–2.6)
PROTHROMBIN TIME: 22.2 SEC (ref 11.5–15.2)

## 2017-04-19 PROCEDURE — 83735 ASSAY OF MAGNESIUM: CPT | Performed by: INTERNAL MEDICINE

## 2017-04-19 PROCEDURE — 74011250637 HC RX REV CODE- 250/637: Performed by: INTERNAL MEDICINE

## 2017-04-19 PROCEDURE — 85610 PROTHROMBIN TIME: CPT | Performed by: INTERNAL MEDICINE

## 2017-04-19 PROCEDURE — 36415 COLL VENOUS BLD VENIPUNCTURE: CPT | Performed by: INTERNAL MEDICINE

## 2017-04-19 PROCEDURE — 82962 GLUCOSE BLOOD TEST: CPT

## 2017-04-19 PROCEDURE — 65660000000 HC RM CCU STEPDOWN

## 2017-04-19 RX ORDER — WARFARIN SODIUM 5 MG/1
5 TABLET ORAL ONCE
Status: COMPLETED | OUTPATIENT
Start: 2017-04-19 | End: 2017-04-19

## 2017-04-19 RX ADMIN — AMIODARONE HYDROCHLORIDE 200 MG: 200 TABLET ORAL at 05:56

## 2017-04-19 RX ADMIN — WARFARIN SODIUM 5 MG: 5 TABLET ORAL at 18:31

## 2017-04-19 RX ADMIN — AMIODARONE HYDROCHLORIDE 400 MG: 200 TABLET ORAL at 22:38

## 2017-04-19 RX ADMIN — Medication 10 ML: at 22:39

## 2017-04-19 RX ADMIN — METOPROLOL SUCCINATE 25 MG: 25 TABLET, EXTENDED RELEASE ORAL at 10:18

## 2017-04-19 RX ADMIN — AMIODARONE HYDROCHLORIDE 400 MG: 200 TABLET ORAL at 15:23

## 2017-04-19 RX ADMIN — Medication 10 ML: at 14:00

## 2017-04-19 RX ADMIN — FUROSEMIDE 20 MG: 20 TABLET ORAL at 10:18

## 2017-04-19 RX ADMIN — LOSARTAN POTASSIUM 25 MG: 25 TABLET ORAL at 10:18

## 2017-04-19 RX ADMIN — Medication 10 ML: at 05:56

## 2017-04-19 NOTE — ROUTINE PROCESS
TRANSFER - IN REPORT:    Verbal report received from Dayana Zaman RN (name) on [place]  being received from ICU (unit) for routine progression of care      Report consisted of patients Situation, Background, Assessment and   Recommendations(SBAR). Information from the following report(s) SBAR, Procedure Summary, Intake/Output, MAR, Recent Results and Cardiac Rhythm SB was reviewed with the receiving nurse. Opportunity for questions and clarification was provided.

## 2017-04-19 NOTE — ROUTINE PROCESS
Bedside and Verbal shift change report given to Kemp Litten, RN (oncoming nurse) by Zoya Mccullough RN   (offgoing nurse). Report included the following information SBAR, OR Summary, Procedure Summary, MAR and Recent Results.

## 2017-04-19 NOTE — PROGRESS NOTES
800 Assumed care of patient from Eyal Staton, RN    1024 Assessment completed, patient is alert and oriented x's 4, no c/o pain. SB on the monitor with a HR in the 40's. Lung fields are clear throughout on RA, 02 sat sustained in the mid 90's with no cough noted. Patient is tolerating a diabetic diet without any N/V or gastric distention, ambulating to the BR voiding without any complications. Scheduled medications administered and patient is currently resting quietly in bed, no s/s of any distress, VSS, call bell and personal belongings within reach, will continue to monitor. 1200 NAD, will continue to monitor. 1526 Scheduled amiodarone administered as ordered without any complications. Patient is currently sitting up in bed watching television, no s/s of any pain or distress, will continue to monitor.

## 2017-04-19 NOTE — PROGRESS NOTES
TRANSFER - OUT REPORT:    Verbal report given to Raji Reveles RN(name) on Shahnaz Speed  being transferred to [address] [place] [address] 340(unit) for routine progression of care       Report consisted of patients Situation, Background, Assessment and   Recommendations(SBAR). Information from the following report(s) SBAR, Kardex, ED Summary, Intake/Output, MAR, Recent Results and Cardiac Rhythm SB c BBB was reviewed with the receiving nurse. Lines:   Peripheral IV 04/15/17 Right External jugular (Active)   Site Assessment Clean, dry, & intact 4/18/2017  4:00 PM   Phlebitis Assessment 0 4/18/2017  4:00 PM   Infiltration Assessment 0 4/18/2017  4:00 PM   Dressing Status Clean, dry, & intact 4/18/2017  4:00 PM   Dressing Type Transparent 4/18/2017  4:00 PM   Hub Color/Line Status Capped 4/18/2017  4:00 PM   Action Taken Open ports on tubing capped 4/18/2017  4:00 PM   Alcohol Cap Used Yes 4/18/2017  4:00 PM        Opportunity for questions and clarification was provided.       Patient transported with:   Monitor  Registered Nurse

## 2017-04-19 NOTE — PROGRESS NOTES
Hospitalist Progress Note    Patient: Vida Duarte MRN: [id number]  CSN: [id number]    YOB: 1955  Age: 64 y.o. Sex: female    DOA: 4/15/2017 LOS:  LOS: 4 days          Chief Complaint:    Shortness of Breath    Assessment/Plan     Hospital Problems  Date Reviewed: 4/15/2017          Codes Class Noted POA    COPD (chronic obstructive pulmonary disease) ([facility] ICD-10-CM: J44.9  ICD-9-CM: 305  4/15/2017 Yes        History of aortic dissection ICD-10-CM: Z86.79  ICD-9-CM: V12.59  4/15/2017 Yes        HLD (hyperlipidemia) ICD-10-CM: E78.5  ICD-9-CM: 272.4  4/15/2017 Yes        Elevated brain natriuretic peptide (BNP) level ICD-10-CM: R79.89  ICD-9-CM: 790.99  4/15/2017 Yes        Atrial fibrillation with RVR ([facility] ICD-10-CM: I48.91  ICD-9-CM: 427.31  6/9/2016 Yes        Hypertension, essential ICD-10-CM: I10  ICD-9-CM: 401.9  10/1/2015 Yes        * (Principal)Acute on chronic systolic congestive heart failure ([facility] ICD-10-CM: I50.23  ICD-9-CM: 428.23, 428.0  2/19/2014 Yes    Overview Signed 9/27/2014  1:45 PM by Jackie Recio MD     EF 25%.                    Acute on chronic CHF and persistent afib  - amiodarone 200mg PO daily starting tomorrow  - toprol xl, losartan, lasix  - coumadin INR goal: 2-3 w/ pharmacy dosing    Renal function and electrolytes stable    Will need to discuss AICD w/ outpatient cardiologist    PT/OT     CM for DC planning    Dispo: Possible DC tomorrow if stable on PO medications      Subjective:    No complaints  Interval summary: No acute events overnight; bradycardic, otherwise AFVSS    Review of systems:    Constitutional: denies fevers, chills, myalgias  Respiratory: denies SOB, cough  Cardiovascular: denies chest pain, palpitations  Gastrointestinal: denies nausea, vomiting, diarrhea      Vital signs/Intake and Output:  Visit Vitals    /56 (BP 1 Location: Left arm, BP Patient Position: At rest)    Pulse (!) 55    Temp 98.1 °F (36.7 °C)    Resp 18    Ht 5' 5\" (1.651 m)    Wt 66.7 kg (147 lb 0.8 oz)    SpO2 94%    BMI 24.47 kg/m2     Current Shift:  04/19 0701 - 04/19 1900  In: 240 [P.O.:240]  Out: -   Last three shifts:  04/17 1901 - 04/19 0700  In: 1250 [P.O.:1250]  Out: 1675 [Urine:1675]    Exam:    General: Well developed, alert, NAD, OX3  Head/Neck: NCAT, supple, No masses, No lymphadenopathy  CVS:bradycardia, no M/R/G, S1/S2 heard, no thrill  Lungs:Clear to auscultation bilaterally, no wheezes, rhonchi, or rales  Abdomen: Soft, Nontender, No distention, Normal Bowel sounds, No hepatomegaly  Extremities: No C/C/E, pulses palpable 2+  Skin:normal texture and turgor, no rashes, no lesions  Neuro:grossly normal , follows commands  Psych:appropriate                Labs: Results:       Chemistry Recent Labs      04/18/17   0500   GLU  93   NA  139   K  4.0   CL  103   CO2  26   BUN  37*   CREA  2.14*   CA  9.0   AGAP  10   BUCR  17   AP  71   TP  7.0   ALB  3.2*   GLOB  3.8   AGRAT  0.8      CBC w/Diff Recent Labs      04/18/17   0500   HGB  12.7   HCT  37.9   PLT  185      Cardiac Enzymes No results for input(s): CPK, CKND1, GARY in the last 72 hours. No lab exists for component: CKRMB, TROIP   Coagulation Recent Labs      04/19/17   0535  04/18/17   0500   PTP  22.2*  22.3*   INR  2.1*  2.1*       Lipid Panel Lab Results   Component Value Date/Time    Cholesterol, total 124 06/09/2016 03:55 AM    HDL Cholesterol 34 06/09/2016 03:55 AM    LDL, calculated 72.8 06/09/2016 03:55 AM    VLDL, calculated 17.2 06/09/2016 03:55 AM    Triglyceride 86 06/09/2016 03:55 AM    CHOL/HDL Ratio 3.6 06/09/2016 03:55 AM      BNP No results for input(s): BNPP in the last 72 hours.    Liver Enzymes Recent Labs      04/18/17   0500   TP  7.0   ALB  3.2*   AP  71   SGOT  19      Thyroid Studies Lab Results   Component Value Date/Time    TSH 1.17 04/15/2017 04:45 AM        Procedures/imaging: see electronic medical records for all procedures/Xrays and details which were not copied into this note but were reviewed prior to [facility] of [facility], DO

## 2017-04-19 NOTE — PROGRESS NOTES
Cardiology Progress Note        Patient: Mallika Lal        Sex: female          DOA: 4/15/2017  YOB: 1955      Age:  64 y.o.        LOS:  LOS: 4 days    Patient seen and examined. Chart reviewed. Assessment/Plan     Patient Active Problem List   Diagnosis Code    Acute respiratory failure ([facility]) J96.00    Pneumonia, organism unspecified J18.9    Acute on chronic systolic congestive heart failure ([facility]) I50.23    Respiratory failure, acute ([address] J96.00         Acute on chronic renal failure ([facility]) N17.9, N18.9    Pneumonia J18.9    New onset a-fib ([facility]) I48.91    CKD (chronic kidney disease) stage 3, GFR 30-59 ml/min T06.1    Systolic CHF, acute ([facility]) I50.21    Hypertension, essential I10         Dehydration E86.0    Atrial fibrillation with RVR ([facility]) I48.91    Acute pulmonary edema ([facility]) J81.0         Respiratory distress R06.00    COPD (chronic obstructive pulmonary disease) ([facility]) J44.9    History of aortic dissection Z86.79    HLD (hyperlipidemia) E78.5    Elevated brain natriuretic peptide (BNP) level R79.89      Persistent atrial fibrillation s/p cardioversion   Plan:    Change Amiodarone to 200 mg PO once a day from tomorrow. Continue Toprol XL, Losartan and Lasix. Continue Coumadin as per PT/INR. INR goal 2-3  Continue management as per hospital medicine. Patient will follow up with her primary cardiologist after discharge. Subjective:    cc:  Denies any chest pain or shortness of breath       REVIEW OF SYSTEMS:     General: No fevers or chills. Cardiovascular: No chest pain,No palpitations, No orthopnea, No PND, No leg swelling, No claudication  Pulmonary: No dyspnea .    Gastrointestinal: No nausea, vomiting, bleeding  Neurology: No Dizziness    Objective:      Visit Vitals    /56 (BP 1 Location: Left arm, BP Patient Position: At rest)    Pulse (!) 55    Temp 98.1 °F (36.7 °C)    Resp 18    Ht 5' 5\" (1.651 m)  Wt 66.7 kg (147 lb 0.8 oz)    SpO2 94%    BMI 24.47 kg/m2     Body mass index is 24.47 kg/(m^2). Physical Exam:  General Appearance: Comfortable, not using accessory muscles of respiration. HEENT: SHANKAR. HEAD: Atraumatic  NECK: No JVD, no thyroidomeglay. CAROTIDS: No bruit  Chest: midline scar   LUNGS: Clear bilaterally. HEART: S1+S2 audible, no murmur, no pericardial rub. ABD: Non-tender, BS Audible    EXT: No edema, and no cyanosis. VASCULAR EXAM: Pulses are intact. PSYCHIATRIC EXAM: Mood is appropriate. MUSCULOSKELETAL: Grossly no joint deformity. NEUROLOGICAL: AAO times 3, No motor and sensory deficit.   Medication:  Current Facility-Administered Medications   Medication Dose Route Frequency    losartan (COZAAR) tablet 25 mg  25 mg Oral DAILY    furosemide (LASIX) tablet 20 mg  20 mg Oral DAILY    insulin lispro (HUMALOG) injection   SubCUTAneous AC&HS    glucose chewable tablet 16 g  4 Tab Oral PRN    glucagon (GLUCAGEN) injection 1 mg  1 mg IntraMUSCular PRN    dextrose (D50W) injection syrg 12.5-25 g  25-50 mL IntraVENous PRN    metoprolol succinate (TOPROL-XL) XL tablet 25 mg  25 mg Oral DAILY    amiodarone (CORDARONE) tablet 400 mg  400 mg Oral Q8H    sodium chloride (NS) flush 5-10 mL  5-10 mL IntraVENous Q8H    sodium chloride (NS) flush 5-10 mL  5-10 mL IntraVENous PRN    Warfarin- Rx to Dose & Monitor  1 Each Other Rx Dosing/Monitoring               Lab/Data Reviewed:       Recent Labs      04/18/17   0500   HGB  12.7   HCT  37.9   PLT  185     Recent Labs      04/18/17   0500   NA  139   K  4.0   CL  103   CO2  26   GLU  93   BUN  37*   CREA  2.14*   CA  9.0       Signed By: Alice Little MD     April 19, 2017

## 2017-04-19 NOTE — PROGRESS NOTES
2015:  Patient arrived to unit. Oriented to room. 2119:  Dr. Juan José Smith paged regarding patient's HR 40-50s since arrival with scheduled amiodarone for 2200.    2129:  Reviewed ordered medications with Dr. Juan José Smith including metoprolol XL 25 mg daily (not given again until 0900 tomorrow) and ordered amiodarone 400mg Q8, with dose scheduled for 2200 and 0600. Also reviewed BPs and HR 40-50s since arrival to tele and most of time in ICU per chart. Ordered to give 200mg amiodarone tonight instead of 400mg. For 0600 dose, if HR >50, can give scheduled 400mg amiodarone. If HR <50, will give 200mg amiodarone. 0555:  HR 46, will give 200mg amiodarone as per Dr. Juan José Smith. Shift summary:  Patient with uneventful shift. HR remained 50s. Patient left in no acute distress with call light and phone within reach.

## 2017-04-19 NOTE — PROGRESS NOTES
Pharmacy Dosing Services: Warfarin    Consult for Warfarin Dosing by Pharmacy by Dr. Luis Manuel Richardson provided for this 64 y.o.  female , for indication of Atrial Fibrillation. Day of Therapy 05  Dose to achieve an INR goal of 2-3    Order entered for  Warfarin  5 mg to be given today at 18:00. Significant drug interactions: Amiodarone    LABS    PT/INR Lab Results   Component Value Date/Time    INR 2.1 04/19/2017 05:35 AM      Platelets Lab Results   Component Value Date/Time    PLATELET 346 36/88/7090 05:00 AM      H/H     Lab Results   Component Value Date/Time    HGB 12.7 04/18/2017 05:00 AM        Warfarin Administrations (last 168 hours)       Date/Time Action Medication Dose      04/18/17 1801 Given    warfarin (COUMADIN) tablet 3 mg 3 mg    04/17/17 1715 Given    warfarin (COUMADIN) tablet 5 mg 5 mg    04/16/17 1755 Given    warfarin (COUMADIN) tablet 7.5 mg 7.5 mg    04/15/17 1724 Given    warfarin (COUMADIN) tablet 5 mg 5 mg          Pharmacy to follow daily and will provide subsequent Warfarin dosing based on clinical status. Mehdi Jordan Pharm. D.   Clinical Pharmacist  [phone]

## 2017-04-20 VITALS
HEART RATE: 52 BPM | TEMPERATURE: 97.5 F | DIASTOLIC BLOOD PRESSURE: 54 MMHG | SYSTOLIC BLOOD PRESSURE: 117 MMHG | RESPIRATION RATE: 18 BRPM | WEIGHT: 147.49 LBS | OXYGEN SATURATION: 92 % | HEIGHT: 65 IN | BODY MASS INDEX: 24.57 KG/M2

## 2017-04-20 LAB
GLUCOSE BLD STRIP.AUTO-MCNC: 103 MG/DL (ref 70–110)
GLUCOSE BLD STRIP.AUTO-MCNC: 98 MG/DL (ref 70–110)
INR PPP: 2.1 (ref 0.8–1.2)
MAGNESIUM SERPL-MCNC: 2.2 MG/DL (ref 1.6–2.6)
PROTHROMBIN TIME: 22.2 SEC (ref 11.5–15.2)

## 2017-04-20 PROCEDURE — 36415 COLL VENOUS BLD VENIPUNCTURE: CPT | Performed by: INTERNAL MEDICINE

## 2017-04-20 PROCEDURE — 85610 PROTHROMBIN TIME: CPT | Performed by: INTERNAL MEDICINE

## 2017-04-20 PROCEDURE — 74011250637 HC RX REV CODE- 250/637: Performed by: INTERNAL MEDICINE

## 2017-04-20 PROCEDURE — 83735 ASSAY OF MAGNESIUM: CPT | Performed by: INTERNAL MEDICINE

## 2017-04-20 PROCEDURE — 82962 GLUCOSE BLOOD TEST: CPT

## 2017-04-20 RX ORDER — AMIODARONE HYDROCHLORIDE 200 MG/1
200 TABLET ORAL DAILY
Status: DISCONTINUED | OUTPATIENT
Start: 2017-04-20 | End: 2017-04-20 | Stop reason: HOSPADM

## 2017-04-20 RX ORDER — LOSARTAN POTASSIUM 25 MG/1
25 TABLET ORAL DAILY
Qty: 30 TAB | Refills: 2 | Status: SHIPPED | OUTPATIENT
Start: 2017-04-20

## 2017-04-20 RX ORDER — METOPROLOL SUCCINATE 25 MG/1
25 TABLET, EXTENDED RELEASE ORAL DAILY
Qty: 30 TAB | Refills: 2 | Status: SHIPPED | OUTPATIENT
Start: 2017-04-20 | End: 2017-10-28

## 2017-04-20 RX ORDER — WARFARIN SODIUM 5 MG/1
5 TABLET ORAL ONCE
Status: DISCONTINUED | OUTPATIENT
Start: 2017-04-20 | End: 2017-04-20 | Stop reason: HOSPADM

## 2017-04-20 RX ORDER — AMIODARONE HYDROCHLORIDE 200 MG/1
200 TABLET ORAL DAILY
Qty: 30 TAB | Refills: 2 | Status: SHIPPED | OUTPATIENT
Start: 2017-04-20

## 2017-04-20 RX ADMIN — LOSARTAN POTASSIUM 25 MG: 25 TABLET ORAL at 10:08

## 2017-04-20 RX ADMIN — Medication 10 ML: at 06:46

## 2017-04-20 RX ADMIN — AMIODARONE HYDROCHLORIDE 200 MG: 200 TABLET ORAL at 10:09

## 2017-04-20 RX ADMIN — FUROSEMIDE 20 MG: 20 TABLET ORAL at 10:09

## 2017-04-20 RX ADMIN — METOPROLOL SUCCINATE 25 MG: 25 TABLET, EXTENDED RELEASE ORAL at 10:08

## 2017-04-20 NOTE — DISCHARGE SUMMARY
Discharge Summary    Patient: Olivia Butcher MRN: [id number]  CSN: [id number]    YOB: 1955  Age: 64 y.o. Sex: female    DOA: 4/15/2017 LOS:  LOS: 5 days   Discharge Date:      Primary Care Provider:  PROVIDER UNKNOWN    Admission Diagnoses: Respiratory distress  V-tach [facility])  Acute pulmonary edema (HCC)  [facility]  Acute respiratory failure ([email or web address]    Discharge Diagnoses:    Problem List as of 4/20/2017  Date Reviewed: 4/15/2017          Codes Class Noted - Resolved    COPD (chronic obstructive pulmonary disease) ([email or web address] ICD-10-CM: J44.9  ICD-9-CM: 664  4/15/2017 - Present        History of aortic dissection ICD-10-CM: Z86.79  ICD-9-CM: V12.59  4/15/2017 - Present        HLD (hyperlipidemia) ICD-10-CM: E78.5  ICD-9-CM: 272.4  4/15/2017 - Present        Elevated brain natriuretic peptide (BNP) level ICD-10-CM: R79.89  ICD-9-CM: 790.99  4/15/2017 - Present        Atrial fibrillation with RVR ([email or web address] ICD-10-CM: I48.91  ICD-9-CM: 427.31  6/9/2016 - Present        CKD (chronic kidney disease) stage 3, GFR 30-59 ml/min ICD-10-CM: N18.3  ICD-9-CM: 585.3  10/1/2015 - Present        Hypertension, essential ICD-10-CM: I10  ICD-9-CM: 401.9  10/1/2015 - Present        Acute on chronic renal failure (HCC) ICD-10-CM: N17.9, N18.9  ICD-9-CM: 584.9, 585.9  9/27/2014 - Present        * (Principal)Acute on chronic systolic congestive heart failure (HCC) ICD-10-CM: I50.23  ICD-9-CM: 428.23, 428.0  2/19/2014 - Present    Overview Signed 9/27/2014  1:45 PM by Kishor Monique MD     EF 25%.              RESOLVED: Acute pulmonary edema (HCC) ICD-10-CM: J81.0  ICD-9-CM: 518.4  4/15/2017 - 4/19/2017        RESOLVED: V-tach ([email or web address] ICD-10-CM: I47.2  ICD-9-CM: 427.1  4/15/2017 - 4/19/2017        RESOLVED: Respiratory distress ICD-10-CM: R06.00  ICD-9-CM: 786.09  4/15/2017 - 4/19/2017        RESOLVED: Respiratory failure, acute ([email or web address] ICD-10-CM: J96.00  ICD-9-CM: 518.81  9/24/2014 - 4/20/2017        RESOLVED: Acute respiratory failure [facility]) ICD-10-CM: J96.00  ICD-9-CM: 518.81  2/19/2014 - 4/19/2017              Discharge Medications:     Current Discharge Medication List      START taking these medications    Details   amiodarone (CORDARONE) 200 mg tablet Take 1 Tab by mouth daily. Qty: 30 Tab, Refills: 2      losartan (COZAAR) 25 mg tablet Take 1 Tab by mouth daily. Qty: 30 Tab, Refills: 2      metoprolol succinate (TOPROL-XL) 25 mg XL tablet Take 1 Tab by mouth daily. Qty: 30 Tab, Refills: 2         CONTINUE these medications which have NOT CHANGED    Details   simvastatin (ZOCOR) 40 mg tablet Take 40 mg by mouth nightly. umeclidinium-vilanterol (ANORO ELLIPTA) 62.5-25 mcg/actuation inhaler Take 1 Puff by inhalation daily. spironolactone (ALDACTONE) 25 mg tablet Take 1 Tab by mouth daily. Qty: 30 Tab, Refills: 1      furosemide (LASIX) 20 mg tablet Take 1 Tab by mouth daily. Qty: 30 Tab, Refills: 1      warfarin (COUMADIN) 5 mg tablet Take 1 Tab by mouth every evening. Qty: 15 Tab, Refills: 0         STOP taking these medications       valsartan (DIOVAN) 160 mg tablet Comments:   Reason for Stopping:         carvedilol (COREG) 25 mg tablet Comments:   Reason for Stopping:         diltiazem CD (CARDIZEM CD) 120 mg ER capsule Comments:   Reason for Stopping:               Discharge Condition: Good    Procedures : cardioversion    Consults: Cardiology      PHYSICAL EXAM   Visit Vitals    /54 (BP 1 Location: Left arm, BP Patient Position: Sitting)    Pulse (!) 52    Temp 97.5 °F (36.4 °C)    Resp 18    Ht 5' 5\" (1.651 m)    Wt 66.9 kg (147 lb 7.8 oz)    SpO2 92%    BMI 24.54 kg/m2     General: Awake, cooperative, no acute distress    HEENT: NC, Atraumatic. PERRLA, EOMI. Anicteric sclerae. Lungs:  CTA Bilaterally. No Wheezing/Rhonchi/Rales. Heart:  Regular  rhythm,  No murmur, No Rubs, No Gallops  Abdomen: Soft, Non distended, Non tender.  +Bowel sounds,   Extremities: No c/c/e  Psych:   Not anxious or agitated. Neurologic:  No acute neurological deficits. Admission HPI : per Dr. Do Austin is a 64 y.o. female who comes to the hospital with complaints of severe sob. Patient was getting ready to go play Bingo around 11pm last night and when she got out of her car, she suddenly became sob. At first she said she felt hot for few minutes and then started complaining of sob. She tells me she has had similar symptoms in the past when she goes in to A fib with rvr. She reports of medical compliance but in the past there is documentation of non compliance. Prior to this episode, she was at her baseline and didn't have any complaints. EMS was called and she was immediately brought to the ED. At some point her sob was worst to a point where she could barely even talk. In the ER she was noted to be in A fib with rvr at first and then her rates went up in 200 where it almost looked like V tach with Pulse per ED staff, sync cardioversion was performed and she was started on Amiodarone drip. This converted her to NSR. She was maintained on BiPAP on which she felt much comfortable. Her ABG showed acidosis, most likely combined metabolic and resp.      When I arrived at bedside, patient was comfortable on BiPAP but just felt tired from what had happened. She requested for me to get hx from the family present at the bedside and she confirmed it. No other complaints at this time. Hospital Course :   Patient's initial EKG showed Wide QRS complex tachycardia - a fib with underlying LBBB  Patient was cardioverted in ER. Persistent A-fib - now sinus rhythm/sinus carina, she was seen and followed by cardiology. She was started on amiodarone, metoprolol, after cardioversion and starting on amiodarone, she converted to sinus rhythm, cardiology optimized her medications based on her Heart rate.  Recommended AICD/pacemaker however patient want to follow up with her cardiologist Dr. Cande Fox and discuss this with him. We continued her anticoagulation with warfarin and her INR remained in thera    Acute on chronic systolic CHF - echo done showed EF of 98-04%, grade 2 diastolic dysfunction. We continued with her diuresis, she is well compensated. Acute respiratory distress - multifactorial etiology, initially required BiPAP, now saturating well on oxygen by NC. BURAK - on CKD 3, monitored her renal function in the setting of diuresis. COPD - continued home medications, no active wheezing. Patient is cleared from cardiology standpoint to be discharged, she is advised to follow up with her PCP and cardiology. Activity: Activity as tolerated    Diet: Cardiac Diet    Follow-up: PCP, Cardiology    Disposition: home     Minutes spent on discharge: 45       Labs: Results:       Chemistry Recent Labs      04/18/17   0500   GLU  93   NA  139   K  4.0   CL  103   CO2  26   BUN  37*   CREA  2.14*   CA  9.0   AGAP  10   BUCR  17   AP  71   TP  7.0   ALB  3.2*   GLOB  3.8   AGRAT  0.8      CBC w/Diff Recent Labs      04/18/17   0500   HGB  12.7   HCT  37.9   PLT  185      Cardiac Enzymes No results for input(s): CPK, CKND1, GARY in the last 72 hours. No lab exists for component: CKRMB, TROIP   Coagulation Recent Labs      04/20/17   0122  04/19/17   0535   PTP  22.2*  22.2*   INR  2.1*  2.1*       Lipid Panel Lab Results   Component Value Date/Time    Cholesterol, total 124 06/09/2016 03:55 AM    HDL Cholesterol 34 06/09/2016 03:55 AM    LDL, calculated 72.8 06/09/2016 03:55 AM    VLDL, calculated 17.2 06/09/2016 03:55 AM    Triglyceride 86 06/09/2016 03:55 AM    CHOL/HDL Ratio 3.6 06/09/2016 03:55 AM      BNP No results for input(s): BNPP in the last 72 hours.    Liver Enzymes Recent Labs      04/18/17   0500   TP  7.0   ALB  3.2*   AP  71   SGOT  19      Thyroid Studies Lab Results   Component Value Date/Time    TSH 1.17 04/15/2017 04:45 AM            Significant Diagnostic Studies: Xr Chest Port    Result Date: 4/15/2017  Indication:  Respiratory distress Comparison:  06/08/16 Time of study - 0057 Findings:  AP erect portable chest The heart size is stable. The patient is status post a median sternotomy. Prominent vascular markings are evident. Density is seen at the lung bases which could indicate early pulmonary edema. No pneumothorax is evident     IMPRESSION: Increased pulmonary vascular markings with pulmonary opacities at both lung bases which could indicate early pulmonary edema. No results found for this or any previous visit.         CC: PROVIDER UNKNOWN

## 2017-04-20 NOTE — DISCHARGE INSTRUCTIONS
Chemical Cardioversion: Care Instructions  Your Care Instructions  Cardioversion resets your heart's rhythm to its normal pattern. It treats heart rhythm problems like atrial fibrillation or supraventricular tachycardia. Chemical cardioversion uses rhythm-control medicines to reset your heart. They can also help keep your heart in a normal rhythm after it has been reset. You may take this medicine as pills. Or you may get it in your arm through a tube called an IV. If you have an IV, it will be done in the hospital. If you use the pills, you might start them in the hospital. Or you might start the pills at home. Your doctor may ask you to take other medicines before your cardioversion. They can help keep blood clots from forming. And they can prevent the heart-rate problem from coming back. Sometimes the heart rate doesn't go back to normal. Or it may reset for a while and then go back to an uneven rate. If this happens, you may need electrical cardioversion. Follow-up care is a key part of your treatment and safety. Be sure to make and go to all appointments, and call your doctor if you are having problems. It's also a good idea to know your test results and keep a list of the medicines you take. How can you care for yourself at home? · Talk to your doctor about the benefits and risks of these medicines. They might cause serious side effects. Your doctor will want to see you often. Be sure to go to all of your doctor visits. · Take your medicines exactly as prescribed. Call your doctor if you think you are having a problem with your medicine. · Check with your doctor or pharmacist before you use any other medicines. This includes over-the-counter medicines. Make sure your doctor knows all of the medicines, vitamins, herbal products, and supplements you take. Taking some medicines together can cause problems. When should you call for help? Call 911 anytime you think you may need emergency care.  For example, call if:  · You passed out (lost consciousness). Call your doctor now or seek immediate medical care if:  · You are dizzy or lightheaded, or you feel like you may faint. · You have a fast or irregular heartbeat. Watch closely for changes in your health, and be sure to contact your doctor if:  · You are not getting better as expected. · You think you are having side effects from your medicine. Where can you learn more? Go to http://vicki-chandrika.info/. Enter S029 in the search box to learn more about \"Chemical Cardioversion: Care Instructions. \"  Current as of: January 27, 2016  Content Version: 11.2  © 8548-4797 [employer]. Care instructions adapted under license by [employer] (which disclaims liability or warranty for this information). If you have questions about a medical condition or this instruction, always ask your healthcare professional. [place] [address] any warranty or liability for your use of this information. Pulmonary Edema: Care Instructions  Your Care Instructions  Pulmonary edema is the buildup of fluid in the lungs. It usually occurs when the heart does not pump blood through the body properly. Pulmonary edema can also be caused by another disease, such as liver or kidney failure. It can also happen at high altitudes, from a poisoning, or as a result of a near-drowning. If you have fluid in your lungs, you may have trouble breathing, be restless, have a fast heart rate, or cough up foamy pink fluid. Breathing problems may be worse when you lie down. Follow-up care is a key part of your treatment and safety. Be sure to make and go to all appointments, and call your doctor if you are having problems. It's also a good idea to know your test results and keep a list of the medicines you take. How can you care for yourself at home? Medicines  · Take your medicines exactly as prescribed.  Call your doctor if you think you are having a problem with your medicine. · Review all of your regular medicines with your doctor. Do not take any vitamins, over-the-counter medicines, or herbal products without talking to your doctor first.  Diet  · Eat a balanced diet. Make an appointment with a dietitian if you have questions about what type of diet might be best for you. · Do not eat more than 2,000 milligrams (mg) of sodium each day. That is less than 1 teaspoon of salt a day, including all the salt you eat in prepared or packaged foods. ¨ Do not add salt while you are cooking or at the table. Flavor with garlic, lemon juice, onion, vinegar, herbs, and spices instead of salt. ¨ Eat fewer processed foods and foods from restaurants, including fast food. ¨ Use fresh or frozen foods instead of canned. ¨ Count and record how much sodium you eat each day. Check food labels for sodium. ¨ Ask your doctor before using salt substitutes that have potassium, such as Lite Salt. Lifestyle  · Stay out of air pollution; smog; cold, dry air; hot, humid air; and high altitudes. · Learn breathing methods that help the airflow in and out of your lungs. · Take rest breaks often. Schedule short rest breaks when doing housework and other activities. An occupational or physical therapist can help you find ways to do everyday activities with less effort. · Start light exercise if your doctor says it is okay. Try to stay as active as possible. If you have not exercised in the past, start out slowly. Walking is a good way to start. · Get enough rest at night. Sleeping with 1 or 2 pillows under your upper body and head may help you breathe easier at night. · Discuss rehabilitation with your doctor. Find out what programs are available in your area. · Do not smoke or use other tobacco products. Smoking can make your condition worse. If you need help quitting, talk to your doctor about stop-smoking programs and medicines.  These can increase your chances of quitting for good. · Do not use alcohol or illegal drugs. When should you call for help? Call 911 anytime you think you may need emergency care. For example, call if:  · You have severe trouble breathing. · You passed out (lost consciousness). · You have symptoms of a heart attack. These may include:  ¨ Chest pain or pressure, or a strange feeling in the chest.  ¨ Sweating. ¨ Shortness of breath. ¨ Nausea or vomiting. ¨ Pain, pressure, or a strange feeling in the back, neck, jaw, or upper belly or in one or both shoulders or arms. ¨ Lightheadedness or sudden weakness. ¨ A fast or irregular heartbeat. Pain that spreads from the chest to the neck, jaw, or one or both shoulders or arms. After you call 911, the  may tell you to chew 1 adult-strength or 2 to 4 low-dose aspirin. Wait for an ambulance. Do not try to drive yourself. Call your doctor now or seek immediate medical care if:  · You have trouble breathing or have wheezing that is getting worse. · You are coughing more deeply or more often. · You cough up blood. · You get a fever. · You have more swelling in your legs or belly. · Your symptoms are getting worse. Watch closely for changes in your health, and be sure to contact your doctor if you have any problems. Where can you learn more? Go to http://vicki-chandrika.info/. Enter W995 in the search box to learn more about \"Pulmonary Edema: Care Instructions. \"  Current as of: May 23, 2016  Content Version: 11.2  © 3171-8223 [employer]. Care instructions adapted under license by [employer] (which disclaims liability or warranty for this information). If you have questions about a medical condition or this instruction, always ask your healthcare professional. [place] [address] any warranty or liability for your use of this information.   Patient armband removed and shredded    DISCHARGE SUMMARY from Nurse    The following personal items are in your possession at time of discharge:    Dental Appliances: None        Home Medications: None  Jewelry: None  Clothing: With patient  Other Valuables: None  Personal Items Sent to Safe: no          PATIENT INSTRUCTIONS:    After general anesthesia or intravenous sedation, for 24 hours or while taking prescription Narcotics:  · Limit your activities  · Do not drive and operate hazardous machinery  · Do not make important personal or business decisions  · Do  not drink alcoholic beverages  · If you have not urinated within 8 hours after discharge, please contact your surgeon on call. Report the following to your surgeon:  · Excessive pain, swelling, redness or odor of or around the surgical area  · Temperature over 100.5  · Nausea and vomiting lasting longer than 4 hours or if unable to take medications  · Any signs of decreased circulation or nerve impairment to extremity: change in color, persistent  numbness, tingling, coldness or increase pain  · Any questions        What to do at Home:  Recommended activity: Activity as tolerated      *  Please give a list of your current medications to your Primary Care Provider. *  Please update this list whenever your medications are discontinued, doses are      changed, or new medications (including over-the-counter products) are added. *  Please carry medication information at all times in case of emergency situations. These are general instructions for a healthy lifestyle:    No smoking/ No tobacco products/ Avoid exposure to second hand smoke    Surgeon General's Warning:  Quitting smoking now greatly reduces serious risk to your health.     Obesity, smoking, and sedentary lifestyle greatly increases your risk for illness    A healthy diet, regular physical exercise & weight monitoring are important for maintaining a healthy lifestyle    You may be retaining fluid if you have a history of heart failure or if you experience any of the following symptoms:  Weight gain of 3 pounds or more overnight or 5 pounds in a week, increased swelling in our hands or feet or shortness of breath while lying flat in bed. Please call your doctor as soon as you notice any of these symptoms; do not wait until your next office visit. Recognize signs and symptoms of STROKE:    F-face looks uneven    A-arms unable to move or move unevenly    S-speech slurred or non-existent    T-time-call 911 as soon as signs and symptoms begin-DO NOT go       Back to bed or wait to see if you get better-TIME IS BRAIN. Warning Signs of HEART ATTACK     Call 911 if you have these symptoms:   Chest discomfort. Most heart attacks involve discomfort in the center of the chest that lasts more than a few minutes, or that goes away and comes back. It can feel like uncomfortable pressure, squeezing, fullness, or pain.  Discomfort in other areas of the upper body. Symptoms can include pain or discomfort in one or both arms, the back, neck, jaw, or stomach.  Shortness of breath with or without chest discomfort.  Other signs may include breaking out in a cold sweat, nausea, or lightheadedness. Don't wait more than five minutes to call 911 - MINUTES MATTER! Fast action can save your life. Calling 911 is almost always the fastest way to get lifesaving treatment. Emergency Medical Services staff can begin treatment when they arrive -- up to an hour sooner than if someone gets to the hospital by car. The discharge information has been reviewed with the patient. The patient verbalized understanding. Discharge medications reviewed with the patient and appropriate educational materials and side effects teaching were provided.

## 2017-04-20 NOTE — PROGRESS NOTES
Shift Summary:  Patient /54, HR 45. Amiodarone 400 mg po due. Dr. Meme Lua made aware and dose adjusted to amiodarone hcl 200 mg po daily. Educated patient on change to Amiodarone.

## 2017-04-20 NOTE — PROGRESS NOTES
Shift summary: uneventful; Pt. Up at tessie. Voiding diana urine, encouraged to drink. No complaints of pain/nausea or signs of distress. Eating well. Possible discharge tomorrow.      Patient Vitals for the past 12 hrs:   Temp Pulse Resp BP SpO2   04/19/17 2242 98.2 °F (36.8 °C) (!) 54 18 133/72 99 %   04/19/17 1935 97.2 °F (36.2 °C) (!) 52 18 124/69 99 %   04/19/17 1550 98.1 °F (36.7 °C) (!) 55 18 115/56 94 %   04/19/17 1137 97.2 °F (36.2 °C) (!) 49 18 107/56 94 %

## 2017-04-20 NOTE — ROUTINE PROCESS
Verbal shift change report given to Lowell Licea RN (oncoming nurse) by Melba El   (offgoing nurse). Report included the following information SBAR, Kardex and MAR.

## 2017-04-20 NOTE — PROGRESS NOTES
Pharmacy Dosing Services: Warfarin    Consult for Warfarin Dosing by Pharmacy by Dr. Venecia Pedroza provided for this 64 y.o.  female , for indication of Atrial Fibrillation. Day of Therapy 6  Dose to achieve an INR goal of 2-3    Order entered for  Warfarin  5 (mg) ordered to be given today at 18:00. Significant drug interactions: Amiodarone  LABS    PT/INR Lab Results   Component Value Date/Time    INR 2.1 04/20/2017 01:22 AM      Platelets Lab Results   Component Value Date/Time    PLATELET 452 56/14/5472 05:00 AM      H/H     Lab Results   Component Value Date/Time    HGB 12.7 04/18/2017 05:00 AM        Warfarin Administrations (last 168 hours)       Date/Time Action Medication Dose      04/19/17 1831 Given    warfarin (COUMADIN) tablet 5 mg 5 mg    04/18/17 1801 Given    warfarin (COUMADIN) tablet 3 mg 3 mg    04/17/17 1715 Given    warfarin (COUMADIN) tablet 5 mg 5 mg    04/16/17 1755 Given    warfarin (COUMADIN) tablet 7.5 mg 7.5 mg    04/15/17 1724 Given    warfarin (COUMADIN) tablet 5 mg 5 mg              Pharmacy to follow daily and will provide subsequent Warfarin dosing based on clinical status.   Bear Higgins PHARMD  Contact information [phone]

## 2017-04-20 NOTE — PROGRESS NOTES
800 Assumed care of patient from Cherry Rojas RN    1011 Assessment completed, patient is alert and oriented x's 4, no c/o pain. SB on the monitor with a HR in the 50's. Lung fields are clear throughout on RA, 02 sat sustained in the low 90's with no cough. Patient is tolerating a diabetic diet without any N/V or gastric distention. Scheduled medications administered as ordered without any complications. Patient is currently sitting up on side of bed watching television, no s/s of any distress, VSS, call bell and personal belongings within reach, will continue to monitor. 1227   IDR/SLIDR Summary          Patient: Minerva Arredondo MRN: [id number]    Age: 64 y.o. YOB: 1955 Room/Bed: 340/01   Admit Diagnosis: Respiratory distress  V-tach (HCC)  Acute pulmonary edema (HCC)  Atrial fibrillation with RVR (HCC)  Acute respiratory failure (HCC)  Principal Diagnosis: Acute on chronic systolic congestive heart failure (HCC)   Goals: discharge home  Readmission: NO  Quality Measure: Not applicable  VTE Prophylaxis: Chemical  Influenza Vaccine screening completed? YES  Pneumococcal Vaccine screening completed? YES  Mobility needs: No   Nutrition plan:No  Consults:    Financial concerns:No  Escalated to CM? NO  RRAT Score:    Interventions:H2H  Testing due for pt today? NO  LOS: 5 days Expected length of stay 0 days  Discharge plan: home   PCP: PROVIDER UNKNOWN  Transportation needs: No    Days before discharge:ready for discharge  Discharge disposition: Home    Signed:     Lanny Hernández RN  4/20/2017  12:28 PM    [phone] Home discharge instructions explained to patient and son, understanding verbalized during answer/quesion session. INT and telemetry monitor removed. Patient escorted out of facility in stable condition via wheelchair    Dual AVS reviewed with Adrienne Kimbrough RN. All medications reviewed individually with patient. Opportunities for questions and concerns provided.   Patient discharged via (mode of transport ie. Car, ambulance or air transport) car  Patient's arm band appropriately discarded.

## 2017-10-28 ENCOUNTER — APPOINTMENT (OUTPATIENT)
Dept: GENERAL RADIOLOGY | Age: 62
DRG: 309 | End: 2017-10-28
Attending: EMERGENCY MEDICINE
Payer: MEDICARE

## 2017-10-28 ENCOUNTER — HOSPITAL ENCOUNTER (INPATIENT)
Age: 62
LOS: 3 days | Discharge: HOME | DRG: 309 | End: 2017-10-31
Attending: EMERGENCY MEDICINE | Admitting: HOSPITALIST
Payer: MEDICARE

## 2017-10-28 DIAGNOSIS — I48.20 CHRONIC A-FIB (HCC): ICD-10-CM

## 2017-10-28 DIAGNOSIS — Z45.02 DEFIBRILLATOR DISCHARGE: Primary | ICD-10-CM

## 2017-10-28 DIAGNOSIS — I42.0 CARDIOMYOPATHY, DILATED (HCC): ICD-10-CM

## 2017-10-28 LAB
ALBUMIN SERPL-MCNC: 3.4 G/DL (ref 3.4–5)
ALBUMIN/GLOB SERPL: 0.8 {RATIO} (ref 0.8–1.7)
ALP SERPL-CCNC: 103 U/L (ref 45–117)
ALT SERPL-CCNC: 47 U/L (ref 13–56)
ANION GAP SERPL CALC-SCNC: 13 MMOL/L (ref 3–18)
APTT PPP: 33.7 SEC (ref 23–36.4)
AST SERPL-CCNC: 49 U/L (ref 15–37)
BASOPHILS # BLD: 0 K/UL (ref 0–0.06)
BASOPHILS NFR BLD: 0 % (ref 0–2)
BILIRUB SERPL-MCNC: 0.3 MG/DL (ref 0.2–1)
BNP SERPL-MCNC: 2773 PG/ML (ref 0–900)
BUN SERPL-MCNC: 24 MG/DL (ref 7–18)
BUN/CREAT SERPL: 13 (ref 12–20)
CALCIUM SERPL-MCNC: 8.5 MG/DL (ref 8.5–10.1)
CHLORIDE SERPL-SCNC: 110 MMOL/L (ref 100–108)
CK MB CFR SERPL CALC: 1.2 % (ref 0–4)
CK MB SERPL-MCNC: 2.5 NG/ML (ref 5–25)
CK SERPL-CCNC: 201 U/L (ref 26–192)
CO2 SERPL-SCNC: 21 MMOL/L (ref 21–32)
CREAT SERPL-MCNC: 1.87 MG/DL (ref 0.6–1.3)
DIFFERENTIAL METHOD BLD: ABNORMAL
EOSINOPHIL # BLD: 0.1 K/UL (ref 0–0.4)
EOSINOPHIL NFR BLD: 1 % (ref 0–5)
ERYTHROCYTE [DISTWIDTH] IN BLOOD BY AUTOMATED COUNT: 15.5 % (ref 11.6–14.5)
GLOBULIN SER CALC-MCNC: 4.1 G/DL (ref 2–4)
GLUCOSE SERPL-MCNC: 127 MG/DL (ref 74–99)
HCT VFR BLD AUTO: 37.6 % (ref 35–45)
HGB BLD-MCNC: 12.6 G/DL (ref 12–16)
INR PPP: 1.4 (ref 0.8–1.2)
LYMPHOCYTES # BLD: 2.2 K/UL (ref 0.9–3.6)
LYMPHOCYTES NFR BLD: 33 % (ref 21–52)
MAGNESIUM SERPL-MCNC: 2 MG/DL (ref 1.6–2.6)
MCH RBC QN AUTO: 29.6 PG (ref 24–34)
MCHC RBC AUTO-ENTMCNC: 33.5 G/DL (ref 31–37)
MCV RBC AUTO: 88.3 FL (ref 74–97)
MONOCYTES # BLD: 0.8 K/UL (ref 0.05–1.2)
MONOCYTES NFR BLD: 11 % (ref 3–10)
NEUTS SEG # BLD: 3.7 K/UL (ref 1.8–8)
NEUTS SEG NFR BLD: 55 % (ref 40–73)
PLATELET # BLD AUTO: 177 K/UL (ref 135–420)
PMV BLD AUTO: 10.8 FL (ref 9.2–11.8)
POTASSIUM SERPL-SCNC: 4.1 MMOL/L (ref 3.5–5.5)
PROT SERPL-MCNC: 7.5 G/DL (ref 6.4–8.2)
PROTHROMBIN TIME: 16.5 SEC (ref 11.5–15.2)
RBC # BLD AUTO: 4.26 M/UL (ref 4.2–5.3)
SODIUM SERPL-SCNC: 144 MMOL/L (ref 136–145)
TROPONIN I SERPL-MCNC: 0.04 NG/ML (ref 0–0.06)
WBC # BLD AUTO: 6.8 K/UL (ref 4.6–13.2)

## 2017-10-28 PROCEDURE — 74011250636 HC RX REV CODE- 250/636: Performed by: EMERGENCY MEDICINE

## 2017-10-28 PROCEDURE — 93005 ELECTROCARDIOGRAM TRACING: CPT

## 2017-10-28 PROCEDURE — 82550 ASSAY OF CK (CPK): CPT | Performed by: EMERGENCY MEDICINE

## 2017-10-28 PROCEDURE — 65610000006 HC RM INTENSIVE CARE

## 2017-10-28 PROCEDURE — 94762 N-INVAS EAR/PLS OXIMTRY CONT: CPT

## 2017-10-28 PROCEDURE — 85730 THROMBOPLASTIN TIME PARTIAL: CPT | Performed by: EMERGENCY MEDICINE

## 2017-10-28 PROCEDURE — 71010 XR CHEST PORT: CPT

## 2017-10-28 PROCEDURE — 85610 PROTHROMBIN TIME: CPT | Performed by: EMERGENCY MEDICINE

## 2017-10-28 PROCEDURE — 74011250637 HC RX REV CODE- 250/637: Performed by: HOSPITALIST

## 2017-10-28 PROCEDURE — 80053 COMPREHEN METABOLIC PANEL: CPT | Performed by: EMERGENCY MEDICINE

## 2017-10-28 PROCEDURE — 99285 EMERGENCY DEPT VISIT HI MDM: CPT

## 2017-10-28 PROCEDURE — 83735 ASSAY OF MAGNESIUM: CPT | Performed by: EMERGENCY MEDICINE

## 2017-10-28 PROCEDURE — 74011250636 HC RX REV CODE- 250/636

## 2017-10-28 PROCEDURE — 96365 THER/PROPH/DIAG IV INF INIT: CPT

## 2017-10-28 PROCEDURE — 85025 COMPLETE CBC W/AUTO DIFF WBC: CPT | Performed by: EMERGENCY MEDICINE

## 2017-10-28 PROCEDURE — 83880 ASSAY OF NATRIURETIC PEPTIDE: CPT | Performed by: EMERGENCY MEDICINE

## 2017-10-28 PROCEDURE — 96375 TX/PRO/DX INJ NEW DRUG ADDON: CPT

## 2017-10-28 PROCEDURE — 77010033678 HC OXYGEN DAILY

## 2017-10-28 RX ORDER — FAMOTIDINE 20 MG/1
20 TABLET, FILM COATED ORAL DAILY
Status: DISCONTINUED | OUTPATIENT
Start: 2017-10-28 | End: 2017-10-31 | Stop reason: HOSPADM

## 2017-10-28 RX ORDER — IPRATROPIUM BROMIDE AND ALBUTEROL SULFATE 2.5; .5 MG/3ML; MG/3ML
3 SOLUTION RESPIRATORY (INHALATION)
Status: DISCONTINUED | OUTPATIENT
Start: 2017-10-28 | End: 2017-10-31 | Stop reason: HOSPADM

## 2017-10-28 RX ORDER — AMIODARONE HCL/D5W 450 MG/250
0.5-1 PLASTIC BAG, INJECTION (ML) INTRAVENOUS
Status: DISCONTINUED | OUTPATIENT
Start: 2017-10-28 | End: 2017-10-28

## 2017-10-28 RX ORDER — AMIODARONE HYDROCHLORIDE 150 MG/3ML
INJECTION, SOLUTION INTRAVENOUS
Status: COMPLETED
Start: 2017-10-28 | End: 2017-10-28

## 2017-10-28 RX ORDER — FUROSEMIDE 10 MG/ML
40 INJECTION INTRAMUSCULAR; INTRAVENOUS DAILY
Status: DISCONTINUED | OUTPATIENT
Start: 2017-10-29 | End: 2017-10-30

## 2017-10-28 RX ORDER — FAMOTIDINE 20 MG/1
20 TABLET, FILM COATED ORAL 2 TIMES DAILY
Status: DISCONTINUED | OUTPATIENT
Start: 2017-10-28 | End: 2017-10-28

## 2017-10-28 RX ORDER — METOPROLOL TARTRATE 25 MG/1
25 TABLET, FILM COATED ORAL
Status: ACTIVE | OUTPATIENT
Start: 2017-10-28 | End: 2017-10-29

## 2017-10-28 RX ORDER — WARFARIN SODIUM 5 MG/1
5 TABLET ORAL
Status: COMPLETED | OUTPATIENT
Start: 2017-10-28 | End: 2017-10-28

## 2017-10-28 RX ORDER — FUROSEMIDE 10 MG/ML
40 INJECTION INTRAMUSCULAR; INTRAVENOUS
Status: COMPLETED | OUTPATIENT
Start: 2017-10-28 | End: 2017-10-28

## 2017-10-28 RX ORDER — AMIODARONE HYDROCHLORIDE 150 MG/3ML
150 INJECTION, SOLUTION INTRAVENOUS
Status: COMPLETED | OUTPATIENT
Start: 2017-10-28 | End: 2017-10-28

## 2017-10-28 RX ADMIN — AMIODARONE HYDROCHLORIDE 0.5 MG/MIN: 1.8 INJECTION, SOLUTION INTRAVENOUS at 19:45

## 2017-10-28 RX ADMIN — AMIODARONE HYDROCHLORIDE 150 MG: 150 INJECTION, SOLUTION INTRAVENOUS at 19:17

## 2017-10-28 RX ADMIN — AMIODARONE HYDROCHLORIDE 150 MG: 50 INJECTION, SOLUTION INTRAVENOUS at 19:17

## 2017-10-28 RX ADMIN — FUROSEMIDE 40 MG: 10 INJECTION, SOLUTION INTRAMUSCULAR; INTRAVENOUS at 19:51

## 2017-10-28 RX ADMIN — FAMOTIDINE 20 MG: 20 TABLET ORAL at 22:46

## 2017-10-28 RX ADMIN — WARFARIN SODIUM 5 MG: 5 TABLET ORAL at 23:58

## 2017-10-28 NOTE — IP AVS SNAPSHOT
[address] [place] [id number] 
[phone] Patient: Radha Becerril MRN: [id number] NLF:[id number] My Medications TAKE these medications as instructed Instructions Each Dose to Equal  
 Morning Noon Evening Bedtime  
 amiodarone 200 mg tablet Commonly known as:  CORDARONE Take 1 Tab by mouth daily. 200 mg  
    
  
   
   
   
  
 furosemide 20 mg tablet Commonly known as:  LASIX Take 1 Tab by mouth daily. 20 mg  
    
   
   
   
  
 isosorbide mononitrate ER 30 mg tablet Commonly known as:  IMDUR Take 1 Tab by mouth daily. 30 mg  
    
   
   
   
  
 losartan 25 mg tablet Commonly known as:  COZAAR Take 1 Tab by mouth daily. 25 mg  
    
   
   
   
  
 metoprolol tartrate 25 mg tablet Commonly known as:  LOPRESSOR Take 0.5 Tabs by mouth every twelve (12) hours. 12.5 mg  
    
   
   
   
  
 simvastatin 40 mg tablet Commonly known as:  ZOCOR Take 40 mg by mouth nightly. 40 mg  
    
   
   
   
  
 spironolactone 25 mg tablet Commonly known as:  ALDACTONE Take 1 Tab by mouth daily. 25 mg  
    
   
   
   
  
 umeclidinium-vilanterol 62.5-25 mcg/actuation inhaler Commonly known as:  Kimberley Pander Take 1 Puff by inhalation daily. 1 Puff  
    
   
   
   
  
 warfarin 5 mg tablet Commonly known as:  COUMADIN Take 1 Tab by mouth every evening. 5 mg Where to Get Your Medications Information on where to get these meds will be given to you by the nurse or doctor. ! Ask your nurse or doctor about these medications  
  isosorbide mononitrate ER 30 mg tablet  
 metoprolol tartrate 25 mg tablet

## 2017-10-28 NOTE — ED PROVIDER NOTES
[address]  EMERGENCY DEPARTMENT HISTORY AND PHYSICAL EXAM       Date: 10/28/2017   Patient Name: Annemarie Krishna   YOB: 1955  Medical Record Number: [id number]    History of Presenting Illness     Chief Complaint   Patient presents with    Other        History Provided By:  Patient and son    Additional History:   7:05 PM   Annemarie Krishna is a 64 y.o. female with PMHX aortic dissection, heart failure, HTN, HDL, COPD, and A-fib and PSHX pacemaker placement presenting to the ED C/O defibrillator firing (x1 at home, x2 en route in EMS), onset 15 PTA. Associated sxs include SOB. Pt states she was sitting at home when she started feeling SOB and felt her defibrillator fire, so she told her son that she may need to come to the hospital. Per son, on the way to the ED, pt's defibrillator fired twice. Pt had defibrillator place in August. Pulse ox was 85% upon arrival. Currently on Coumadin. Pt notes hx of kidney disease but is not on dialysis. Pt denies chest pain, fever, chills, EtOH use, and any other symptoms or complaints. Primary Care Provider: PROVIDER UNKNOWN   Specialist:    Past History     Past Medical History:   Past Medical History:   Diagnosis Date    A-fib [facility])     Aortic dissection (HCC)     COPD [facility]chronic obstructive pulmonary disease) ([facility] [address]     Heart failure (HCC)     High cholesterol     Hypertension         Past Surgical History:   Past Surgical History:   Procedure Laterality Date    CARDIAC SURG PROCEDURE UNLIST      HX PACEMAKER          Family History:   History reviewed. No pertinent family history. Social History:   Social History   Substance Use Topics    Smoking status: Former Smoker     Packs/day: 1.00     Years: 25.00     Types: Cigarettes     Quit date: 2/18/2004    Smokeless tobacco: Never Used    Alcohol use No      Comment: occasional alcohol ingestion        Allergies:    Allergies   Allergen Reactions    Pcn [Penicillins] Unknown (comments)        Review of Systems   Review of Systems   Constitutional: Negative for chills and fever. Respiratory: Positive for shortness of breath. Cardiovascular: Negative for chest pain. Neurological: Negative for syncope. All other systems reviewed and are negative. Physical Exam  Vitals:    10/28/17 2345 10/29/17 0000 10/29/17 0030 10/29/17 0100   BP: 164/76 154/75 151/67 148/80   Pulse: 66 69 65 (!) 107   Resp: 19 17 15 16   Temp:       SpO2: 98% 98% 99% 99%   Weight:       Height:           Physical Exam   Constitutional: She is oriented to person, place, and time. She appears well-developed and well-nourished. She appears ill (chronically). No distress. HENT:   Head: Normocephalic and atraumatic. Right Ear: External ear normal.   Left Ear: External ear normal.   Nose: Nose normal.   Mouth/Throat: Oropharynx is clear and moist.   Eyes: Conjunctivae and EOM are normal. Pupils are equal, round, and reactive to light. Right eye exhibits no discharge. Left eye exhibits no discharge. No scleral icterus. Neck: Normal range of motion. Neck supple. No JVD present. No tracheal deviation present. Cardiovascular: Regular rhythm, normal heart sounds and intact distal pulses. Tachycardia present. Pulmonary/Chest: Effort normal and breath sounds normal.   Abdominal: Soft. Bowel sounds are normal. She exhibits distension (slightly). There is no tenderness. Musculoskeletal: Normal range of motion. Right lower leg: She exhibits no edema. Left lower leg: She exhibits no edema. Neurological: She is alert and oriented to person, place, and time. She has normal reflexes. No cranial nerve deficit. Cranial nerve 2-12 intact. A&O x 4. Skin: Skin is warm and dry. No rash noted. Psychiatric: Her behavior is normal. Her mood appears anxious. Nursing note and vitals reviewed.        Diagnostic Study Results     Labs -      Recent Results (from the past 12 hour(s))   CBC WITH AUTOMATED DIFF    Collection Time: 10/28/17  7:33 PM   Result Value Ref Range    WBC 6.8 4.6 - 13.2 K/uL    RBC 4.26 4.20 - 5.30 M/uL    HGB 12.6 12.0 - 16.0 g/dL    HCT 37.6 35.0 - 45.0 %    MCV 88.3 74.0 - 97.0 FL    MCH 29.6 24.0 - 34.0 PG    MCHC 33.5 31.0 - 37.0 g/dL    RDW 15.5 (H) 11.6 - 14.5 %    PLATELET 161 510 - 596 K/uL    MPV 10.8 9.2 - 11.8 FL    NEUTROPHILS 55 40 - 73 %    LYMPHOCYTES 33 21 - 52 %    MONOCYTES 11 (H) 3 - 10 %    EOSINOPHILS 1 0 - 5 %    BASOPHILS 0 0 - 2 %    ABS. NEUTROPHILS 3.7 1.8 - 8.0 K/UL    ABS. LYMPHOCYTES 2.2 0.9 - 3.6 K/UL    ABS. MONOCYTES 0.8 0.05 - 1.2 K/UL    ABS. EOSINOPHILS 0.1 0.0 - 0.4 K/UL    ABS. BASOPHILS 0.0 0.0 - 0.06 K/UL    DF AUTOMATED     METABOLIC PANEL, COMPREHENSIVE    Collection Time: 10/28/17  7:33 PM   Result Value Ref Range    Sodium 144 136 - 145 mmol/L    Potassium 4.1 3.5 - 5.5 mmol/L    Chloride 110 (H) 100 - 108 mmol/L    CO2 21 21 - 32 mmol/L    Anion gap 13 3.0 - 18 mmol/L    Glucose 127 (H) 74 - 99 mg/dL    BUN 24 (H) 7.0 - 18 MG/DL    Creatinine 1.87 (H) 0.6 - 1.3 MG/DL    BUN/Creatinine ratio 13 12 - 20      GFR est AA 33 (L) >60 ml/min/1.73m2    GFR est non-AA 27 (L) >60 ml/min/1.73m2    Calcium 8.5 8.5 - 10.1 MG/DL    Bilirubin, total 0.3 0.2 - 1.0 MG/DL    ALT (SGPT) 47 13 - 56 U/L    AST (SGOT) 49 (H) 15 - 37 U/L    Alk.  phosphatase 103 45 - 117 U/L    Protein, total 7.5 6.4 - 8.2 g/dL    Albumin 3.4 3.4 - 5.0 g/dL    Globulin 4.1 (H) 2.0 - 4.0 g/dL    A-G Ratio 0.8 0.8 - 1.7     PROTHROMBIN TIME + INR    Collection Time: 10/28/17  7:33 PM   Result Value Ref Range    Prothrombin time 16.5 (H) 11.5 - 15.2 sec    INR 1.4 (H) 0.8 - 1.2     PTT    Collection Time: 10/28/17  7:33 PM   Result Value Ref Range    aPTT 33.7 23.0 - 36.4 SEC   CARDIAC PANEL,(CK, CKMB & TROPONIN)    Collection Time: 10/28/17  7:33 PM   Result Value Ref Range     (H) 26 - 192 U/L    CK - MB 2.5 <3.6 ng/ml    CK-MB Index 1.2 0.0 - 4.0 %    Troponin-I, Qt. 0.04 0.00 - 0.06 NG/ML   NT-PRO BNP    Collection Time: 10/28/17  7:33 PM   Result Value Ref Range    NT pro-BNP 2773 (H) 0 - 900 PG/ML   MAGNESIUM    Collection Time: 10/28/17  7:33 PM   Result Value Ref Range    Magnesium 2.0 1.6 - 2.6 mg/dL       Radiologic Studies -  XR CHEST PORT    (Results Pending)     9:30 PM  RADIOLOGY FINDINGS  Chest X-ray shows CHF  Pending review by Radiologist  Recorded by JASIEL Morrisonibchelly, as dictated by Juliana Lorenzana MD     Medical Decision Making   I am the first provider for this patient. I reviewed the vital signs, available nursing notes, past medical history, past surgical history, family history and social history. Vital Signs-Reviewed the patient's vital signs. Patient Vitals for the past 12 hrs:   Temp Pulse Resp BP SpO2   10/29/17 0100 - (!) 107 16 148/80 99 %   10/29/17 0030 - 65 15 151/67 99 %   10/29/17 0000 - 69 17 154/75 98 %   10/28/17 2345 - 66 19 164/76 98 %   10/28/17 2330 - 74 17 149/78 100 %   10/28/17 2321 97.8 °F (36.6 °C) - - - -   10/28/17 2315 - 66 19 154/69 -   10/28/17 2313 - 69 19 161/73 -   10/28/17 2215 98.1 °F (36.7 °C) 75 14 156/71 100 %   10/28/17 2200 - 69 18 143/60 99 %   10/28/17 2130 - 75 21 146/79 98 %   10/28/17 2030 - 88 19 194/86 100 %   10/28/17 2000 - 76 25 161/75 98 %   10/28/17 1945 - 76 19 172/77 98 %   10/28/17 1930 - (!) 113 19 158/88 100 %   10/28/17 1910 98 °F (36.7 °C) (!) 163 24 172/82 (!) 85 %       INITIAL CLINICAL IMPRESSION and PLANS:  The patient presents with the primary complaint(s) of: defibrillator firing. The presentation, to include historical aspects and clinical findings are consistent with the DX of defibrillator firing. However, other possible DX's to consider as primary, associated with, or exacerbated by include:    1.  V-tach  2. A-fib  3. MI  4. ACS  5.  PE  6. Dissection  7.   Electrolyte abnormality    Considering the above, my initial management plan to evaluate and therapeutic interventions include the following and as noted in the orders:    1. Labs: CBC, CMP, prothrombin time + INR, PTT, cardiac panel, NT-pro BNP, magnesium  2.  Imaging: CXR    Recorded by Gustabo Cuellar ED Scribe, as dictated by Myke Macario MD.      Pulse Oximetry Analysis - Normal 98% on RA. Nasal canula. Cardiac Monitor:   Rate: 88 bpm  Rhythm: Sinus Tachycardia      EKG interpretation: (Preliminary)  7:08 PM  156 bpm; Demand pacemaker, interpretation is based on intrinsic rhythm; A-fib with RVR with premature ventricular or aberrantly or aberrantly conducted complexes; Left axis deviation; nonspecific intraventricular block; Abnormal ECG  EKG read by Myke Macario MD at 7:09 PM     EKG interpretation: (Subsequent)  7:42 PM   74 bpm; Electronic ventricular pacemaker; QTc 566 ms  EKG read by Myke Macario MD at 7:42 PM    EKG interpretation: (Preliminary)  10:26 PM   67 bpm; electronic ventricular pacemaker; QTc 551 ms  EKG read by Myke Macario MD at 10:26 PM    Old Medical Records: Nursing notes. ED Course:     7:05 PM Initial assessment performed. The patients presenting problems have been discussed, and they are in agreement with the care plan formulated and outlined with them. I have encouraged them to ask questions as they arise throughout their visit. CONSULT NOTE:   7:27 PM  Myke Macario MD spoke with Katharine Lopez MD   Specialty: Hospitalist  Discussed pt's hx, disposition, and available diagnostic and imaging results. Reviewed care plans. Consulting physician agrees with plans as outlined. She will admit to ICU. Written by Gustabo Cuellar ED Scribe, as dictated by Myke Macario MD.    CONSULT NOTE:   7:33 PM  Myke Macario MD spoke with Sergio Arreola MD   Specialty: Cardiology  Discussed pt's hx, disposition, and available diagnostic and imaging results. Reviewed care plans. Consulting physician agrees with plans as outlined.   Advised Metoprolol 25 mg PO now, then BID and continue Warfarin. Admit to ICU. He will consult pt. Written by JASIEL Rees, as dictated by Spike Benz MD.      Medications Given in the ED:  Medications   amiodarone (NEXTERONE) 360 mg in dextrose 200 mL (1.8 mg/mL) infusion (0.5 mg/min IntraVENous Rate Verify 10/28/17 2311)   metoprolol tartrate (LOPRESSOR) tablet 25 mg (not administered)   losartan (COZAAR) tablet 25 mg (not administered)   simvastatin (ZOCOR) tablet 40 mg (40 mg Oral Given 10/29/17 0138)   spironolactone (ALDACTONE) tablet 25 mg (not administered)   umeclidinium-vilanterol (ANORO ELLIPTA) 62.5 mcg- 25 mcg/inhalation (not administered)   furosemide (LASIX) injection 40 mg (not administered)   albuterol-ipratropium (DUO-NEB) 2.5 MG-0.5 MG/3 ML (not administered)   famotidine (PEPCID) tablet 20 mg (20 mg Oral Given 10/28/17 2246)   Warfarin: Pharmacy to Dose (not administered)   amiodarone (CORDARONE) injection 150 mg (150 mg IntraVENous Given 10/28/17 1917)   furosemide (LASIX) injection 40 mg (40 mg IntraVENous Given 10/28/17 1951)   warfarin (COUMADIN) tablet 5 mg (5 mg Oral Given 10/28/17 2358)        ADMISSION NOTE:  7:31 PM  Patient is being admitted to the hospital by Sunita Jones MD. The results of their tests and reasons for their admission have been discussed with them and/or available family. They convey agreement and understanding for the need to be admitted and for their admission diagnosis. Written by JASIEL Rees, as dictated by Spike Benz MD.    CONDITIONS ON ADMISSION:  Deep Vein Thrombosis is not present at the time of admission. Thrombosis is not present at the time of admission. Urinary Tract Infection is not present at the time of admission. Pneumonia is not present at the time of admission. MRSA is not present at the time of admission. Wound infection is not present at the time of admission. Pressure Ulcer is not present at the time of admission.       CLINICAL IMPRESSION    1. Defibrillator discharge    2. Chronic a-fib (HCC)    3. Cardiomyopathy, dilated ([address]        PLAN: ADMIT TO ICU    7:31 PM  I have spent 45 minutes of critical care time involved in lab review, consultations with specialist, family decision-making, and documentation. During this entire length of time I was immediately available to the patient. Critical Care: The reason for providing this level of medical care for this critically ill patient was due a critical illness that impaired one or more vital organ systems such that there was a high probability of imminent or life threatening deterioration in the patients condition. This care involved high complexity decision making to assess, manipulate, and support vital system functions, to treat this degreee vital organ system failure and to prevent further life threatening deterioration of the patients condition. _______________________________   Attestations:     SCRIBE ATTESTATION:  This note is prepared by Madyson Valladares, acting as Scribe for Jaymie Ch MD.    PROVIDER ATTESTATION:  Zachary Luis MD: The scribe's documentation has been prepared under my direction and personally reviewed by me in its entirety.  I confirm that the note above accurately reflects all work, treatment, procedures, and medical decision making performed by me.   _______________________________

## 2017-10-28 NOTE — ED TRIAGE NOTES
Pt states watching TV felt a little warm and sob, pt has defibrillator put in August, pt states she felt like she needed to go to hospital, pt spouse put her in car, states defibrillator fired approx 3 times en route to ED, pt states she feels sob. Pt pulled up in EMS bay, pt brought into ED and found to have 85% on pulse ox. Denies chest pain   Sepsis Screening completed    (  )Patient meets SIRS criteria. ( x )Patient does not meet SIRS criteria.       SIRS Criteria is achieved when two or more of the following are present   Temperature < 96.8°F (36°C) or > 100.9°F (38.3°C)   Heart Rate > 90 beats per minute   Respiratory Rate > 20 breaths per minute   WBC count > 12,000 or <4,000 or > 10% bands

## 2017-10-28 NOTE — IP AVS SNAPSHOT
Merlin Maurice 
 
 
 [address] [place] [address] 
[phone] Patient: Mike Sampson MRN: [id number] About your hospitalization You were admitted on:  October 28, 2017 You last received care in the:  THE [facility] [address] You were discharged on:  October 31, 2017 Why you were hospitalized Your primary diagnosis was:  Defibrillator Discharge Your diagnoses also included:  Acute On Chronic Renal Failure (Hcc), Acute On Chronic Systolic Congestive Heart Failure (Hcc), A-Fib (Hcc), Ckd (Chronic Kidney Disease) Stage 3, Gfr 30-59 Ml/Min, Copd (Chronic Obstructive Pulmonary Disease) (Hcc), History Of Aortic Dissection, Cardiomyopathy, Dilated (Hcc), Chronic A-Fib (Hcc) Things You Need To Do (next 8 weeks) Follow up with Kiara Estrada MD  
  
Phone:  [phone] Where:  [address] [place] [address] Follow up with Caio Cameron MD  
  
Phone:  [phone] Where:  [address], [address] [place] [address] Follow up with Patient prefers to schedule her own follow-up appointments. Discharge Orders None A check temo indicates which time of day the medication should be taken. My Medications TAKE these medications as instructed Instructions Each Dose to Equal  
 Morning Noon Evening Bedtime  
 amiodarone 200 mg tablet Commonly known as:  CORDARONE Take 1 Tab by mouth daily. 200 mg  
    
  
   
   
   
  
 furosemide 20 mg tablet Commonly known as:  LASIX Take 1 Tab by mouth daily. 20 mg  
    
   
   
   
  
 isosorbide mononitrate ER 30 mg tablet Commonly known as:  IMDUR Take 1 Tab by mouth daily. 30 mg  
    
   
   
   
  
 losartan 25 mg tablet Commonly known as:  COZAAR Take 1 Tab by mouth daily. 25 mg  
    
   
   
   
  
 metoprolol tartrate 25 mg tablet Commonly known as:  LOPRESSOR Take 0.5 Tabs by mouth every twelve (12) hours. 12.5 mg  
    
   
   
   
  
 simvastatin 40 mg tablet Commonly known as:  ZOCOR Take 40 mg by mouth nightly. 40 mg  
    
   
   
   
  
 spironolactone 25 mg tablet Commonly known as:  ALDACTONE Take 1 Tab by mouth daily. 25 mg  
    
   
   
   
  
 umeclidinium-vilanterol 62.5-25 mcg/actuation inhaler Commonly known as:  Kasaan Alamin Take 1 Puff by inhalation daily. 1 Puff  
    
   
   
   
  
 warfarin 5 mg tablet Commonly known as:  COUMADIN Take 1 Tab by mouth every evening. 5 mg Where to Get Your Medications Information on where to get these meds will be given to you by the nurse or doctor. ! Ask your nurse or doctor about these medications  
  isosorbide mononitrate ER 30 mg tablet  
 metoprolol tartrate 25 mg tablet Discharge Instructions Learning About ACE Inhibitors for Heart Failure Introduction ACE (angiotensin-converting enzyme) inhibitors block an enzyme that makes blood vessels narrow. As a result, the blood vessels relax and widen. This lowers blood pressure. These medicines also put more water and salt into the urine. This also lowers blood pressure. In heart failure, your heart does not pump as much blood as your body needs. These medicines: · Make it easier for your heart to pump. · Help reduce symptoms. · Make a heart attack or stroke less likely. Examples These medicines include: · Benazepril (Lotensin). · Lisinopril (Prinivil, Zestril). · Ramipril (Altace). This is not a complete list. 
Possible side effects Side effects may include: · Cough. · Low blood pressure. You may feel dizzy and weak. · High potassium levels. · An allergic reaction. You may have other side effects or reactions not listed here. Check the information that comes with your medicine. What to know about taking this medicine · ACE inhibitors: ¨ Are often used to treat heart failure. They may be the only medicine used if your only symptoms are feeling tired and mildly short of breath with no fluid buildup (edema). But in most other cases, they are used with other medicines. ¨ Can cause a dry cough. If the cough is bad, talk to your doctor. You may need to try a different medicine. ¨ Can relieve symptoms, improve how you feel, and make it less likely you will need to stay in a hospital. And they can reduce the risk of early death. ¨ Can cause an allergic reaction of the skin. Symptoms may range from mild swelling to painful welts. It is rare to have severe swelling that makes it hard to breathe. · Take your medicines exactly as prescribed. Call your doctor if you think you are having a problem with your medicine. · Check with your doctor or pharmacist before you use any other medicines. This includes over-the-counter medicines. Make sure your doctor knows all of the medicines, vitamins, herbal products, and supplements you take. Taking some medicines together can cause problems. · You should not take an ACE inhibitor if you are pregnant or planning to become pregnant. · You may need regular blood tests. Where can you learn more? Go to http://vicki-chandrika.info/. Enter Y037 in the search box to learn more about \"Learning About ACE Inhibitors for Heart Failure. \" Current as of: September 21, 2016 Content Version: 11.4 © 7483-1250 [employer]OI. Care instructions adapted under license by [employer] (which disclaims liability or warranty for this information). If you have questions about a medical condition or this instruction, always ask your healthcare professional. [address] any warranty or liability for your use of this information. Deciding About Stopping Dialysis Deciding About Stopping Dialysis What should you know about stopping dialysis? Dialysis is a process that filters waste from your blood when your kidneys can no longer do the job. When you have kidney failure, you may have either hemodialysis or peritoneal dialysis. Most people die within weeks of stopping dialysis. If you decide to stop, health professionals can help you have the highest quality of life possible. These are people who give end-of-life care. This may be done through hospice care. Hospice care offers the chance to think about personal goals. It can relieve pain. And it can help take care of your emotional and spiritual needs. No matter what you decide, take the time to let others know your wishes about your care. You can use a legal document to make sure that you get the medical treatment you want. This is called an advance directive. What are crawford points about this decision? · You may feel better on dialysis than you did before you started treatment. But you may have side effects, such as appetite changes. Or you may start to have other problems. If this is the case, you may feel that continuing treatment is too hard. · If dialysis lets you do the activities you enjoyed before, you may feel that it hasn't changed your daily life that much. You may feel this way even if you can't do all of your old activities. Or you may feel that your quality of life on dialysis is not good. · Your diagnosis of kidney failure may force you to rethink your goals for your future. If you feel that your life has been rewarding and that you have met many goals, you may feel okay about stopping dialysis. But if you have goals you have not yet met, you may want to continue. · Most people die within weeks of stopping dialysis. If you choose to stop, you should be ready to put your personal, financial, and legal affairs in order. You may want to continue dialysis if you aren't ready to face these issues. · Clearly state your wishes to your family. If you decide to stop treatment, will your family understand your reasons? Do they support your decision to continue (or stop) treatment? Why might you choose to stop dialysis? If you have been getting regular dialysis, and if a kidney transplant is not an option for you, stopping dialysis may: · Give you more time each day to spend with friends and family instead of going to treatments. · Allow you to eat and drink what you want in the time you have left. You may welcome this if your diet has been limited while you have been on dialysis. · Relieve worries about paying for dialysis, if you have been concerned. · Reduce problems that come with regular dialysis. These problems may include infection or clotting of the access. · Encourage you to talk with your loved ones about end-of-life goals and wishes. Why might you choose to stay on dialysis? Staying on dialysis may: · Allow you to live longer and have more time with your family and friends. · Give you time to complete your goals and projects. · Help you feel better for as long as possible. You may feel better physically on dialysis than you did before dialysis. · Allow you to do your normal activities. · Give you more time to put your affairs in order. Your decision Thinking about the facts and your feelings can help you make a decision that is right for you. Be sure you understand the benefits and risks of your options, and think about what else you need to do before you make the decision. Where can you learn more? Go to http://vicki-chandrika.info/. Enter W944 in the search box to learn more about \"Deciding About Stopping Dialysis. \" Current as of: May 12, 2017 Content Version: 11.4 © 3508-6059 Healthwise, Incorporated.  Care instructions adapted under license by [employer] (which disclaims liability or warranty for this information). If you have questions about a medical condition or this instruction, always ask your healthcare professional. [address] any warranty or liability for your use of this information. Introducing [facility] SERVICES! Kate Newton introduces Nuxeo patient portal. Now you can access parts of your medical record, email your doctor's office, and request medication refills online. 1. In your internet browser, go to [email or web address] 2. Click on the First Time User? Click Here link in the Sign In box. You will see the New Member Sign Up page. 3. Enter your Nuxeo Access Code exactly as it appears below. You will not need to use this code after youve completed the sign-up process. If you do not sign up before the expiration date, you must request a new code. · Nuxeo Access Code: [email or web address] Expires: 1/29/2018  9:59 AM 
 
4. Enter the last four digits of your Social Security Number (xxxx) and Date of Birth (mm/dd/yyyy) as indicated and click Submit. You will be taken to the next sign-up page. 5. Create a Nuxeo ID. This will be your Nuxeo login ID and cannot be changed, so think of one that is secure and easy to remember. 6. Create a Nuxeo password. You can change your password at any time. 7. Enter your Password Reset Question and Answer. This can be used at a later time if you forget your password. 8. Enter your e-mail address. You will receive e-mail notification when new information is available in [address]. 9. Click Sign Up. You can now view and download portions of your medical record. 10. Click the Download Summary menu link to download a portable copy of your medical information. If you have questions, please visit the Frequently Asked Questions section of the Nuxeo website. Remember, Nuxeo is NOT to be used for urgent needs. For medical emergencies, dial 911. Now available from your iPhone and Android! Providers Seen During Your Hospitalization Provider Specialty Primary office phone Oneil Lam MD Emergency Medicine [phone] Blaise Caba MD Family Practice [phone] Your Primary Care Physician (PCP) Primary Care Physician Office Phone Office Lupe Arrieta [address] [phone] You are allergic to the following Allergen Reactions Pcn (Penicillins) Unknown (comments) Recent Documentation Height Weight Breastfeeding? BMI OB Status Smoking Status 1.626 m 67.4 kg No 25.51 kg/m2 Postmenopausal Former Smoker Emergency Contacts Name Discharge Info Relation Home Work Mobile Cornel Rivas DISCHARGE CAREGIVER [3] Boyfriend [17]   [phone] Patient Belongings The following personal items are in your possession at time of discharge: 
  Dental Appliances: None  Visual Aid: None      Home Medications: None   Jewelry: Ring  Clothing: Undergarments, With patient    Other Valuables: None Please provide this summary of care documentation to your next provider. Signatures-by signing, you are acknowledging that this After Visit Summary has been reviewed with you and you have received a copy. Patient Signature:  ____________________________________________________________ Date:  ____________________________________________________________  
  
Ángel Edmonds Provider Signature:  ____________________________________________________________ Date:  ____________________________________________________________

## 2017-10-29 LAB
ANION GAP SERPL CALC-SCNC: 9 MMOL/L (ref 3–18)
BUN SERPL-MCNC: 27 MG/DL (ref 7–18)
BUN/CREAT SERPL: 14 (ref 12–20)
CALCIUM SERPL-MCNC: 8.7 MG/DL (ref 8.5–10.1)
CHLORIDE SERPL-SCNC: 107 MMOL/L (ref 100–108)
CK MB CFR SERPL CALC: 0.9 % (ref 0–4)
CK MB CFR SERPL CALC: 1.1 % (ref 0–4)
CK MB SERPL-MCNC: 3.8 NG/ML (ref 5–25)
CK MB SERPL-MCNC: 4.2 NG/ML (ref 5–25)
CK SERPL-CCNC: 335 U/L (ref 26–192)
CK SERPL-CCNC: 454 U/L (ref 26–192)
CO2 SERPL-SCNC: 23 MMOL/L (ref 21–32)
CREAT SERPL-MCNC: 1.9 MG/DL (ref 0.6–1.3)
ERYTHROCYTE [DISTWIDTH] IN BLOOD BY AUTOMATED COUNT: 15.6 % (ref 11.6–14.5)
GLUCOSE SERPL-MCNC: 118 MG/DL (ref 74–99)
HCT VFR BLD AUTO: 36.9 % (ref 35–45)
HGB BLD-MCNC: 12.4 G/DL (ref 12–16)
INR PPP: 1.3 (ref 0.8–1.2)
MCH RBC QN AUTO: 30.1 PG (ref 24–34)
MCHC RBC AUTO-ENTMCNC: 33.6 G/DL (ref 31–37)
MCV RBC AUTO: 89.6 FL (ref 74–97)
PLATELET # BLD AUTO: 154 K/UL (ref 135–420)
PMV BLD AUTO: 10.7 FL (ref 9.2–11.8)
POTASSIUM SERPL-SCNC: 4.4 MMOL/L (ref 3.5–5.5)
PROTHROMBIN TIME: 15.8 SEC (ref 11.5–15.2)
RBC # BLD AUTO: 4.12 M/UL (ref 4.2–5.3)
SODIUM SERPL-SCNC: 139 MMOL/L (ref 136–145)
TROPONIN I SERPL-MCNC: 0.12 NG/ML (ref 0–0.06)
TROPONIN I SERPL-MCNC: 0.13 NG/ML (ref 0–0.06)
WBC # BLD AUTO: 7.5 K/UL (ref 4.6–13.2)

## 2017-10-29 PROCEDURE — 80048 BASIC METABOLIC PNL TOTAL CA: CPT | Performed by: HOSPITALIST

## 2017-10-29 PROCEDURE — 82550 ASSAY OF CK (CPK): CPT | Performed by: HOSPITALIST

## 2017-10-29 PROCEDURE — 74011250637 HC RX REV CODE- 250/637: Performed by: INTERNAL MEDICINE

## 2017-10-29 PROCEDURE — 85610 PROTHROMBIN TIME: CPT | Performed by: HOSPITALIST

## 2017-10-29 PROCEDURE — 65610000006 HC RM INTENSIVE CARE

## 2017-10-29 PROCEDURE — 74011250636 HC RX REV CODE- 250/636: Performed by: INTERNAL MEDICINE

## 2017-10-29 PROCEDURE — 36415 COLL VENOUS BLD VENIPUNCTURE: CPT | Performed by: HOSPITALIST

## 2017-10-29 PROCEDURE — 93306 TTE W/DOPPLER COMPLETE: CPT

## 2017-10-29 PROCEDURE — 74011250637 HC RX REV CODE- 250/637: Performed by: HOSPITALIST

## 2017-10-29 PROCEDURE — 74011250636 HC RX REV CODE- 250/636: Performed by: EMERGENCY MEDICINE

## 2017-10-29 PROCEDURE — 77010033678 HC OXYGEN DAILY

## 2017-10-29 PROCEDURE — 85027 COMPLETE CBC AUTOMATED: CPT | Performed by: HOSPITALIST

## 2017-10-29 PROCEDURE — 74011250636 HC RX REV CODE- 250/636: Performed by: HOSPITALIST

## 2017-10-29 PROCEDURE — 77030032490 HC SLV COMPR SCD KNE COVD -B

## 2017-10-29 RX ORDER — SIMVASTATIN 20 MG/1
40 TABLET, FILM COATED ORAL
Status: DISCONTINUED | OUTPATIENT
Start: 2017-10-29 | End: 2017-10-31 | Stop reason: HOSPADM

## 2017-10-29 RX ORDER — ISOSORBIDE MONONITRATE 30 MG/1
30 TABLET, EXTENDED RELEASE ORAL DAILY
Status: DISCONTINUED | OUTPATIENT
Start: 2017-10-30 | End: 2017-10-31 | Stop reason: HOSPADM

## 2017-10-29 RX ORDER — METOPROLOL TARTRATE 25 MG/1
12.5 TABLET, FILM COATED ORAL EVERY 12 HOURS
Status: DISCONTINUED | OUTPATIENT
Start: 2017-10-29 | End: 2017-10-31 | Stop reason: HOSPADM

## 2017-10-29 RX ORDER — ACETAMINOPHEN 325 MG/1
650 TABLET ORAL
Status: DISCONTINUED | OUTPATIENT
Start: 2017-10-29 | End: 2017-10-31 | Stop reason: HOSPADM

## 2017-10-29 RX ORDER — WARFARIN SODIUM 5 MG/1
5 TABLET ORAL ONCE
Status: COMPLETED | OUTPATIENT
Start: 2017-10-29 | End: 2017-10-29

## 2017-10-29 RX ORDER — ENOXAPARIN SODIUM 100 MG/ML
1 INJECTION SUBCUTANEOUS EVERY 24 HOURS
Status: DISCONTINUED | OUTPATIENT
Start: 2017-10-29 | End: 2017-10-31 | Stop reason: HOSPADM

## 2017-10-29 RX ORDER — SPIRONOLACTONE 25 MG/1
25 TABLET ORAL DAILY
Status: DISCONTINUED | OUTPATIENT
Start: 2017-10-29 | End: 2017-10-31 | Stop reason: HOSPADM

## 2017-10-29 RX ORDER — AMIODARONE HYDROCHLORIDE 200 MG/1
200 TABLET ORAL DAILY
Status: DISCONTINUED | OUTPATIENT
Start: 2017-10-30 | End: 2017-10-31 | Stop reason: HOSPADM

## 2017-10-29 RX ORDER — LOSARTAN POTASSIUM 25 MG/1
25 TABLET ORAL DAILY
Status: DISCONTINUED | OUTPATIENT
Start: 2017-10-29 | End: 2017-10-31 | Stop reason: HOSPADM

## 2017-10-29 RX ORDER — WARFARIN SODIUM 5 MG/1
5 TABLET ORAL EVERY EVENING
Status: DISCONTINUED | OUTPATIENT
Start: 2017-10-29 | End: 2017-10-29

## 2017-10-29 RX ADMIN — AMIODARONE HYDROCHLORIDE 0.5 MG/MIN: 1.8 INJECTION, SOLUTION INTRAVENOUS at 06:57

## 2017-10-29 RX ADMIN — WARFARIN SODIUM 5 MG: 5 TABLET ORAL at 17:36

## 2017-10-29 RX ADMIN — AMIODARONE HYDROCHLORIDE 0.5 MG/MIN: 1.8 INJECTION, SOLUTION INTRAVENOUS at 19:30

## 2017-10-29 RX ADMIN — FUROSEMIDE 40 MG: 10 INJECTION, SOLUTION INTRAMUSCULAR; INTRAVENOUS at 09:06

## 2017-10-29 RX ADMIN — LOSARTAN POTASSIUM 25 MG: 25 TABLET ORAL at 09:06

## 2017-10-29 RX ADMIN — SIMVASTATIN 40 MG: 20 TABLET, FILM COATED ORAL at 21:57

## 2017-10-29 RX ADMIN — ENOXAPARIN SODIUM 70 MG: 80 INJECTION SUBCUTANEOUS at 07:09

## 2017-10-29 RX ADMIN — SIMVASTATIN 40 MG: 20 TABLET, FILM COATED ORAL at 01:38

## 2017-10-29 RX ADMIN — SPIRONOLACTONE 25 MG: 25 TABLET, FILM COATED ORAL at 09:06

## 2017-10-29 RX ADMIN — METOPROLOL TARTRATE 12.5 MG: 25 TABLET ORAL at 21:57

## 2017-10-29 NOTE — ED NOTES
Patient placed on bedpan at this time. Patient remains on CCM with noted SR as repeat EKG completed. Patient denies any c/o pain at this time. Patient remains on O2 @ 2 liters via NC. Patient up to position of comfort with no s/s distress noted. Spouse at bedside.

## 2017-10-29 NOTE — H&P
[address] [place] [address]  ROUTINE HISTORY AND PHYSICAL    Name:  Haider Trammell  MR#:  [id number]  :  1955  Account #:  [de-identified]  Date of Adm:  10/28/2017      ADMITTING DIAGNOSES:  1. Defibrillator discharge with V-tach. 2. Atrial fibrillation with rapid ventricular response. 3. Acute on chronic severe systolic congestive heart failure. 4. History of chronic obstructive pulmonary disease. 5. Chronic kidney disease stage 3.  6. Hypertension. HISTORY OF PRESENT ILLNESS: This is a 70-year-old black female  with a history of a defibrillator placed in August. She has a history of  ventricular tachycardia, chronic atrial fibrillation on Coumadin, COPD,  hyperlipidemia, hypertension, and she sees Dr. Maddy Amaro at  [address] [place] [address] Cardiology and Dr. Echo Henriquez. Today, she came into the  emergency room because she was sitting at home when she started  feeling short of breath. She felt her defibrillator go off at home. On the  way to the emergency room, her defibrillator fired twice more. This was  placed again back in August, 2 months ago. Her pulse oximetry was  85% upon arrival. She is taking her Coumadin and her usual  medications. She did not miss any of her medications. Today, she was  at [employer] with her grandson's birthday party, but no other  changes in her routine. She has a history of chronic kidney disease  stage III. She does not drink any alcohol. Her last hospitalization here  at [facility] [place], operated by [facility] was in 2017 for acute on chronic systolic  heart failure then. PAST MEDICAL HISTORY:  1. Atrial fibrillation, aortic dissection, COPD, defibrillator placement for  V-tach and severe cardiomyopathy  2. Systolic congestive heart failure, hypercholesterolemia,  hypertension. PAST SURGICAL HISTORY: Defibrillator, pacemaker placement. FAMILY HISTORY: Coronary artery disease. SOCIAL HISTORY: Used to smoke cigarettes, quit in , had 25-  pack-year history.  No regular alcohol use. No drugs. ALLERGIES: PENICILLIN. REVIEW OF SYSTEMS  CONSTITUTIONAL: No recent upper respiratory infection symptoms,  fevers or chills. RESPIRATORY: She had shortness of breath with minimal ambulation  today. CARDIOVASCULAR: No chest pain, but she felt her defibrillator fire  and she noted that at least 3 times this evening. She denies any recent  leg swelling. NEUROLOGIC: No dizziness, lightheadedness, headache or syncope. HEMATOLOGIC: No bleeding or bruisability on Coumadin. GENITOURINARY: No dysuria or hematuria. GASTROINTESTINAL: No nausea, vomiting, diarrhea. No  hematochezia. MEDICATIONS: Medications that she is on at home include. 1. Amiodarone. 2. Lasix. 3. Cozaar. 4. Zocor. 5. Aldactone. 6. Ellipta inhaler. 7. Coumadin. PHYSICAL EXAMINATION  GENERAL: The patient is a 66-year-old [employer] female,  looking comfortable in the bed now, feeling back to baseline and  oriented x3, no acute distress. VITAL SIGNS: Temperature 98, pulse of , atrial fibrillation. Blood pressure 172/82, respiratory rate 24. SaO2 85 up to 96% now  on nasal cannula O2. She is oriented x3, in no acute distress. She  appears as a well-developed black female. NECK: Supple. No thyromegaly, lymphadenopathy or JVD. HEENT: Oropharynx is dry. No lesions. Sclerae are anicteric. Conjunctiva pale. LUNGS: Diminished breath sounds at the bases. No rales, rhonchi, or  wheezes noted. CARDIAC: Irregular rhythm, tachycardic. No murmur, rub, or gallop  defibrillator. Surgical site incision appears clear in the left upper chest  wall. ABDOMEN: Soft, nontender. No distention. No hepatosplenomegaly. No ascites. Normal bowel sounds. LOWER EXTREMITIES: No clubbing, cyanosis, or edema. Distal  pulses are palpable. NEUROLOGIC: Nonfocal.    LABORATORY DATA: Show white blood cell count 6.8, hemoglobin  and hematocrit is 12.6 and 37.6, platelets are 943. INR is 1.4.   Potassium and sodium were normal. Glucose 127, BUN and creatinine  24 and 1.87. Her LFTs, ALT is 47, AST 49. . Troponin 0.04. BNP 2773. INR 1.4. Chest x-ray shows some intravascular congestion, enlarged heart  border. No evidence for acute infiltrate. EKG not done in or at least not  documented. It looks like her last echo with us in April 2017 showed an  EF of 10% to 15%, severely dilated left atrium, severe global  hypokinesis and subsequently that makes her. ASSESSMENT AND PLAN: As follows with critical care requirement  for this patient's admission by system. 1. Cardiovascular. This patient has advanced cardiomyopathy with  severe systolic congestive heart failure and depressed ejection fraction  of 10% to 15%, last measured here in April 2017. I do not know about  recent echo reports from [facility]. The patient had a defibrillator  placed 2 months ago. Clearly it is deployed and fired at least 3 times in  the past 12 hours. At this point, her cardiac enzymes are normal, but  we need to repeat those. She has been placed on an amiodarone drip,  which will be maintained for her to go to the intensive care  unit. Cardiology has been notified from the emergency room, they  recommended giving her metoprolol if her heart rate would maintain,  was added a low-dose twice daily. She will need pharmacy dosing of  her Coumadin and following her INR daily. She appears to be  comfortable at this point in time. I have reconciled her home  medications to include her Coumadin dosing and we have resumed  her Zocor and Aldactone, but obviously we would hold her p.o.  amiodarone and Lasix at this point in time. I have ordered another dose  of IV Lasix. She seems to be diuresing effectively and has had a fairly  good amount of urine output, at least 400 mL. Also, she declined a  Mcgarry catheter. She put this out on the bedpan while I was in the room  with her.  Tonight, we are maintaining an amiodarone drip, Cardiology  consultation, serial cardiac markers and followup with metabolic profile  and CBC in the morning. Daily INR. I think her cardiac history and  current cardiac issues put her at very high risk for mortality from his  cardiac disease. Certainly monitoring in the ICU tonight with  Cardiology consultation and Intensivist consultation by the morning  time is appropriate. She does appear to be comfortable and stable at  the moment now and will follow her along closely. 2. Respiratory. With her history of chronic obstructive pulmonary  disease, will have DuoNeb ordered as needed. She should be on  oxygen nasal cannula, should be appropriate monitoring her O2  saturations. 3. Gastrointestinal. Will place her on Pepcid tablets daily for stress  ulcer prophylaxis. Deep vein thrombosis prophylaxis. She is on  Coumadin, but her INR is subtherapeutic, we so will go ahead and  place her on sequential compression devices. 4. We are going to order a repeat echocardiogram for the morning. 5. Fluids, electrolytes, nutrition, cardiac diet as noted. GI as noted,  stress ulcer prophylaxis. 6. Genitourinary. She declined a Mcgarry catheter. We need close and  strict I's and O's and anticipating her length of stay to be at least 3  days at this point in time. CONSULTATIONS: As noted above. Critical care time 40 minutes on this patient's admission to the hospital  in the intensive care unit.         Celine Franklin MD    RI / RONY  D:  10/28/2017   21:20  T:  10/29/2017   08:35  Job #:  [id number]

## 2017-10-29 NOTE — ED NOTES
Assumed patient care at this time. Patient is noted to be on CCM with noted stable v-tach with only c/o SOB. Patient reports onset this p.m and states shocked by Defibrillator x 2 prior to arrival to ED. Patient on O2 @ 2 liters via NC with no s/s distress noted. Patient is noted to be up to position of comfort at this time.

## 2017-10-29 NOTE — PROGRESS NOTES
40 minutes of critical care time spent in the direct evaluation and treatment of this high risk patient. The reason for providing this level of medical care for this critically ill patient was due a critical illness that impaired one or more vital organ systems such that there was a high probability of imminent or life threatening deterioration in the patients condition. This care involved high complexity decision making to assess, manipulate, and support vital system functions, to treat this degreee vital organ system failure and to prevent further life threatening deterioration of the patients condition.  Ca  Patient Active Problem List   Diagnosis Code    Acute on chronic systolic congestive heart failure ([facility]) I50.23    Acute on chronic renal failure ([facility]) N17.9, N18.9    CKD (chronic kidney disease) stage 3, GFR 30-59 ml/min N18.3    Hypertension, essential I10    A-fib ([facility]) I48.91    COPD (chronic obstructive pulmonary disease) ([facility]) J44.9    History of aortic dissection Z86.79    HLD (hyperlipidemia) E78.5    Elevated brain natriuretic peptide (BNP) level R79.89    Defibrillator discharge Z45.02    Cardiomyopathy, dilated ([facility]) I42.0    Chronic a-fib ([facility]) I48.2

## 2017-10-29 NOTE — PROGRESS NOTES
Pharmacy Dosing Services: Renal Dosing     Physician Dr. Sheldon Galan    The following medication: Lovenox was automatically dose-adjusted per [facility] P&T Committee Protocol, with respect to renal function. Consult provided for this   64 y.o. , female. Pt Weight:   Wt Readings from Last 1 Encounters:   10/28/17 65.8 kg (145 lb)     Previous Regimen Lovenox 70 mg SC every 12 hours   Serum Creatinine Lab Results   Component Value Date/Time    Creatinine 1.90 10/29/2017 01:47 AM       Creatinine Clearance Estimated Creatinine Clearance: 29 mL/min (based on Cr of 1.9). BUN Lab Results   Component Value Date/Time    BUN 27 10/29/2017 01:47 AM       Dosage changed to:  Lovenox 70 mg SC every 24 hours    Pharmacy to continue to monitor patient daily. Will make dosage adjustments based upon changing renal function.   Reyes Dias PHARMD. Contact information: [phone]

## 2017-10-29 NOTE — PROGRESS NOTES
2315 Patient arrived on unit via stretcher, amiodarone 0.5 mg/min. 2345 Admission assessment completed, see EMR. Patient alert and oriented x4 and eyes round reactive to light, move all extremities bilaterally. Lung sounds clear, positive bowel sounds and denies pain or SOB.     0400 Reassessment completed, see EMR    Patient rested comfortable throughout the night, no complaint of pain or SOB.

## 2017-10-29 NOTE — PROGRESS NOTES
Hospitalist Progress Note    Patient: Maria T Ewing MRN: [id number]  CSN: [id number]    YOB: 1955  Age: 64 y.o.   Sex: female    DOA: 10/28/2017 LOS:  LOS: 1 day          Chief Complaint:    Vtach      Assessment/Plan     Vtach s/p defibrillator firing at home  recent defibrillator placement in August  Severe CHF -EF 10-15%  Chronic afib  HTN  CKD stage 3  COPD    Resting comfortably this am  No issues overnight  On amiodarone gtt  lovenox therapeutic dosing as trops are elevated-less than 1, no chest pain, but INR on coumadin subtherapeutic  Cardiology consult today  Low dose metoprolol ordered but HR in 60's so watch  Labs reviewed  ICU care for now  Disposition :home health 1-2 days  Patient Active Problem List   Diagnosis Code    Acute on chronic systolic congestive heart failure (HCC) I50.23    Acute on chronic renal failure (HCC) N17.9, N18.9    CKD (chronic kidney disease) stage 3, GFR 30-59 ml/min N18.3    Hypertension, essential I10    A-fib ([facility] [address] I48.91    COPD (chronic obstructive pulmonary disease) ([facility] [address] J44.9    History of aortic dissection Z86.79    HLD (hyperlipidemia) E78.5    Elevated brain natriuretic peptide (BNP) level R79.89    Defibrillator discharge Z45.02    Cardiomyopathy, dilated (HCC) I42.0    Chronic a-fib (HCC) I48.2       Subjective:    No new issues    Review of systems:    Constitutional: denies fevers, chills, myalgias  Respiratory: denies SOB, cough  Cardiovascular: denies chest pain, palpitations  Gastrointestinal: denies nausea, vomiting, diarrhea      Vital signs/Intake and Output:  Visit Vitals    /67    Pulse 65    Temp 97.6 °F (36.4 °C)    Resp 16    Ht 5' 4\" (1.626 m)    Wt 65.8 kg (145 lb)    SpO2 99%    Breastfeeding No    BMI 24.89 kg/m2     Current Shift:     Last three shifts:  10/27 1901 - 10/29 0700  In: 66.8 [I.V.:66.8]  Out: 1050 [Urine:1050]    Exam:    General: Well developed, alert, NAD, OX3  Head/Neck: NCAT, supple, No masses, No lymphadenopathy  CVS:irreg rhythm, reg rate, no M/R/G, S1/S2 heard, no thrill  Lungs:Clear to auscultation bilaterally, no wheezes, rhonchi, or rales  Abdomen: Soft, Nontender, No distention, Normal Bowel sounds, No hepatomegaly  Extremities: No C/C/E, pulses palpable 2+  Skin:normal texture and turgor, no rashes, no lesions  Neuro:grossly normal , follows commands  Psych:appropriate                Labs: Results:       Chemistry Recent Labs      10/29/17   0147  10/28/17   1933   GLU  118*  127*   NA  139  144   K  4.4  4.1   CL  107  110*   CO2  23  21   BUN  27*  24*   CREA  1.90*  1.87*   CA  8.7  8.5   AGAP  9  13   BUCR  14  13   AP   --   103   TP   --   7.5   ALB   --   3.4   GLOB   --   4.1*   AGRAT   --   0.8      CBC w/Diff Recent Labs      10/29/17   0147  10/28/17   1933   WBC  7.5  6.8   RBC  4.12*  4.26   HGB  12.4  12.6   HCT  36.9  37.6   PLT  154  177   GRANS   --   55   LYMPH   --   33   EOS   --   1      Cardiac Enzymes Recent Labs      10/29/17   0147  10/28/17   1933   CPK  335*  201*   CKND1  1.1  1.2      Coagulation Recent Labs      10/29/17   0147  10/28/17   1933   PTP  15.8*  16.5*   INR  1.3*  1.4*   APTT   --   33.7       Lipid Panel Lab Results   Component Value Date/Time    Cholesterol, total 124 06/09/2016 03:55 AM    HDL Cholesterol 34 06/09/2016 03:55 AM    LDL, calculated 72.8 06/09/2016 03:55 AM    VLDL, calculated 17.2 06/09/2016 03:55 AM    Triglyceride 86 06/09/2016 03:55 AM    CHOL/HDL Ratio 3.6 06/09/2016 03:55 AM      BNP No results for input(s): BNPP in the last 72 hours.    Liver Enzymes Recent Labs      10/28/17   1933   TP  7.5   ALB  3.4   AP  103   SGOT  49*      Thyroid Studies Lab Results   Component Value Date/Time    TSH 1.17 04/15/2017 04:45 AM        Procedures/imaging: see electronic medical records for all procedures/Xrays and details which were not copied into this note but were reviewed prior to creation of Damion Paul MD

## 2017-10-29 NOTE — ROUTINE PROCESS
Bedside and Verbal shift change report given to EDDI Garcia (oncoming nurse)Report included the following information SBAR, Kardex, Intake/Output, MAR and Recent Results. Alarm parameters reviewed and opportunities for questions provided.

## 2017-10-29 NOTE — PROGRESS NOTES
Pharmacy Dosing Services:  Warfarin    Consult for Warfarin Dosing by Pharmacy by Dr. Taveras Dilcia provided for this 64 y.o.  female , for indication of Atrial Fibrillation. Dose to achieve an INR goal of 2-3    Order entered for  Warfarin  5 (mg) ordered to be given today at 18:00. Significant drug interactions:  Amiodarone    LABS    PT/INR Lab Results   Component Value Date/Time    INR 1.3 10/29/2017 01:47 AM      Platelets Lab Results   Component Value Date/Time    PLATELET 286 37/57/7829 01:47 AM      H/H     Lab Results   Component Value Date/Time    HGB 12.4 10/29/2017 01:47 AM        Warfarin Administrations (last 168 hours)     Date/Time Action Medication Dose    10/28/17 5624 Given    warfarin (COUMADIN) tablet 5 mg 5 mg          Pharmacy to follow daily and will provide subsequent Warfarin dosing based on clinical status.   Jennifer Garcia, [facility]     Contact information [phone]

## 2017-10-29 NOTE — ROUTINE PROCESS
Bedside, Verbal and Written shift change report given to EDDI Mari (oncoming nurse) by KATHERINE GermanRN (offgoing nurse). Report included the following information SBAR, Kardex, Intake/Output and Recent Results. Assumed care of pt at this time. Pt aox4 following commands, denies pain and or sob at this time. Telemetry continued, O2 continued, gtt continued. 1000  Scheduled med's given, pt tolerated well. VSS    1540  Dr. Carlton Geronimo at bedside assessing pt. Questions answered. Orders received. Interrogate AICD in morning. 46  Pt presented me with [employer] for AICD. Rep called. Spoke with Angie Thomas. Call place to rep Rudy Zhao immediatly returned call. Confirmed AICD interrogation @ 0700 in morning. 1915  Bedside, Verbal and Written shift change report given to Anahy RN (oncoming nurse) by EDDI Mari (offgoing nurse). Report included the following information SBAR, Kardex, Intake/Output and Recent Results.

## 2017-10-29 NOTE — PROGRESS NOTES
Pharmacy Dosing Services: Renal Dosing    Physician Dr. Kavita Mcmahan    The following medication: Pepcid was automatically dose-adjusted per THE [facility] P[facility]T [facility] Protocol, with respect to renal function. Consult provided for this   64 y.o. , female. Pt Weight:   Wt Readings from Last 1 Encounters:   10/28/17 65.8 kg (145 lb)     Previous Regimen Pepcid 20 mg PO BID   Serum Creatinine Lab Results   Component Value Date/Time    Creatinine 1.87 10/28/2017 07:33 PM       Creatinine Clearance Estimated Creatinine Clearance: 29.5 mL/min (based on Cr of 1.87). BUN Lab Results   Component Value Date/Time    BUN 24 10/28/2017 07:33 PM       Dosage changed to:  Pepcid 20 mg PO Daily    Pharmacy to continue to monitor patient daily. Will make dosage adjustments based upon changing renal function.   IGLESIA KnoxD. Contact information: [phone]

## 2017-10-29 NOTE — ROUTINE PROCESS
TRANSFER - IN REPORT:    Verbal report received from FRANNY Perdomo RN(name) on Jan Lomeli  being received from ED(unit) for routine progression of care      Report consisted of patients Situation, Background, Assessment and   Recommendations(SBAR). Information from the following report(s) SBAR, Kardex, Intake/Output, MAR and Recent Results was reviewed with the receiving nurse. Opportunity for questions and clarification was provided.

## 2017-10-29 NOTE — PROGRESS NOTES
Pharmacy Dosing Services: Warfarin    Consult for Warfarin Dosing by Pharmacy by Dr. Dre Gallego provided for this 64 y.o.  female , for indication of Atrial Fibrillation. Dose to achieve an INR goal of 2-3    Order entered for Warfarin 5 mg to be given today at 2300. Significant drug interactions: amiodarone  LABS    PT/INR Lab Results   Component Value Date/Time    INR 1.4 10/28/2017 07:33 PM      Platelets Lab Results   Component Value Date/Time    PLATELET 290 40/60/8333 07:33 PM      H/H     Lab Results   Component Value Date/Time    HGB 12.6 10/28/2017 07:33 PM        Warfarin Administrations (last 168 hours)       None          Home Regimen:  Warfarin 5 mg PO Daily    Pharmacy to follow daily and will provide subsequent Warfarin dosing based on clinical status.   [place] [address] [email or web address] [phone]

## 2017-10-29 NOTE — ED NOTES
Placed on bedpan at this time. Patient remains on CCM with noted SR as documented. Patient denies any complaints at this time with no s/s distress noted. Patient awaiting bed assignment.

## 2017-10-30 LAB
ANION GAP SERPL CALC-SCNC: 12 MMOL/L (ref 3–18)
BUN SERPL-MCNC: 30 MG/DL (ref 7–18)
BUN/CREAT SERPL: 16 (ref 12–20)
CALCIUM SERPL-MCNC: 8.8 MG/DL (ref 8.5–10.1)
CHLORIDE SERPL-SCNC: 103 MMOL/L (ref 100–108)
CO2 SERPL-SCNC: 23 MMOL/L (ref 21–32)
CREAT SERPL-MCNC: 1.82 MG/DL (ref 0.6–1.3)
ERYTHROCYTE [DISTWIDTH] IN BLOOD BY AUTOMATED COUNT: 15.2 % (ref 11.6–14.5)
GLUCOSE SERPL-MCNC: 88 MG/DL (ref 74–99)
HCT VFR BLD AUTO: 39.2 % (ref 35–45)
HGB BLD-MCNC: 13.2 G/DL (ref 12–16)
INR PPP: 1.5 (ref 0.8–1.2)
MCH RBC QN AUTO: 29.5 PG (ref 24–34)
MCHC RBC AUTO-ENTMCNC: 33.7 G/DL (ref 31–37)
MCV RBC AUTO: 87.7 FL (ref 74–97)
PLATELET # BLD AUTO: 172 K/UL (ref 135–420)
PMV BLD AUTO: 11.4 FL (ref 9.2–11.8)
POTASSIUM SERPL-SCNC: 3.8 MMOL/L (ref 3.5–5.5)
PROTHROMBIN TIME: 17.8 SEC (ref 11.5–15.2)
RBC # BLD AUTO: 4.47 M/UL (ref 4.2–5.3)
SODIUM SERPL-SCNC: 138 MMOL/L (ref 136–145)
WBC # BLD AUTO: 5.3 K/UL (ref 4.6–13.2)

## 2017-10-30 PROCEDURE — 65610000006 HC RM INTENSIVE CARE

## 2017-10-30 PROCEDURE — 74011250637 HC RX REV CODE- 250/637: Performed by: HOSPITALIST

## 2017-10-30 PROCEDURE — 36415 COLL VENOUS BLD VENIPUNCTURE: CPT | Performed by: HOSPITALIST

## 2017-10-30 PROCEDURE — 80048 BASIC METABOLIC PNL TOTAL CA: CPT | Performed by: HOSPITALIST

## 2017-10-30 PROCEDURE — 85610 PROTHROMBIN TIME: CPT | Performed by: HOSPITALIST

## 2017-10-30 PROCEDURE — 85027 COMPLETE CBC AUTOMATED: CPT | Performed by: HOSPITALIST

## 2017-10-30 PROCEDURE — 74011250636 HC RX REV CODE- 250/636: Performed by: HOSPITALIST

## 2017-10-30 PROCEDURE — 74011250637 HC RX REV CODE- 250/637: Performed by: INTERNAL MEDICINE

## 2017-10-30 RX ORDER — WARFARIN SODIUM 5 MG/1
5 TABLET ORAL ONCE
Status: COMPLETED | OUTPATIENT
Start: 2017-10-30 | End: 2017-10-30

## 2017-10-30 RX ORDER — FUROSEMIDE 40 MG/1
40 TABLET ORAL DAILY
Status: DISCONTINUED | OUTPATIENT
Start: 2017-10-30 | End: 2017-10-31 | Stop reason: HOSPADM

## 2017-10-30 RX ORDER — POTASSIUM CHLORIDE 20 MEQ/1
20 TABLET, EXTENDED RELEASE ORAL DAILY
Status: DISCONTINUED | OUTPATIENT
Start: 2017-10-30 | End: 2017-10-31 | Stop reason: HOSPADM

## 2017-10-30 RX ADMIN — ENOXAPARIN SODIUM 70 MG: 80 INJECTION SUBCUTANEOUS at 07:21

## 2017-10-30 RX ADMIN — SIMVASTATIN 40 MG: 20 TABLET, FILM COATED ORAL at 21:39

## 2017-10-30 RX ADMIN — LOSARTAN POTASSIUM 25 MG: 25 TABLET ORAL at 09:13

## 2017-10-30 RX ADMIN — SPIRONOLACTONE 25 MG: 25 TABLET, FILM COATED ORAL at 09:13

## 2017-10-30 RX ADMIN — FUROSEMIDE 40 MG: 40 TABLET ORAL at 09:13

## 2017-10-30 RX ADMIN — AMIODARONE HYDROCHLORIDE 200 MG: 200 TABLET ORAL at 09:15

## 2017-10-30 RX ADMIN — POTASSIUM CHLORIDE 20 MEQ: 20 TABLET, EXTENDED RELEASE ORAL at 13:00

## 2017-10-30 RX ADMIN — ISOSORBIDE MONONITRATE 30 MG: 30 TABLET, EXTENDED RELEASE ORAL at 09:15

## 2017-10-30 RX ADMIN — METOPROLOL TARTRATE 12.5 MG: 25 TABLET ORAL at 21:39

## 2017-10-30 RX ADMIN — FAMOTIDINE 20 MG: 20 TABLET ORAL at 09:13

## 2017-10-30 RX ADMIN — WARFARIN SODIUM 5 MG: 5 TABLET ORAL at 19:23

## 2017-10-30 NOTE — PROGRESS NOTES
1900 Shift report received from EDDI Castro RN. Report included the following information SBAR, Kardex, Intake/Output, MAR, Recent Results and Cardiac Rhythm Paced/ICD. Amiodarone gtt verified with [employer]. Shift assessment completed per doc flowsheet. Assisted to [facility].  0000  Reassessment w/no change in status. Resting quietly, NAD, Vss.  0400  Reassessment w/no change in status. Resting quietly, NAD, Vss    Bedside and Verbal shift change report given to Yazmin Olmstead (oncoming nurse) by Adria Martin RN (offgoing nurse). Report included the following information SBAR, Kardex, Intake/Output, MAR and Recent Results.

## 2017-10-30 NOTE — PROGRESS NOTES
Hospitalist Progress Note    Patient: Annemarie Krishna MRN: [id number]  CSN: [id number]    YOB: 1955  Age: 64 y.o.   Sex: female    DOA: 10/28/2017 LOS:  LOS: 2 days          Chief Complaint:    Defibrillator firing      Assessment/Plan     Vtach s/p defibrillator firing at home  recent defibrillator placement in August  Severe CHF -EF 15-20% on echo here  Chronic afib  HTN  CKD stage 3  COPD  AC with coumadin    lovenox for now as bridge, INR 1.5  Change lasix to PO  Defibrillator interrogation today  Wean off amio gtt as per cardiology  PT consult  Labs have been reviewed    I discussed with her significant other on phone  Disposition :HHN 1 day  Patient Active Problem List   Diagnosis Code    Acute on chronic systolic congestive heart failure (HCC) I50.23    Acute on chronic renal failure (HCC) N17.9, N18.9    CKD (chronic kidney disease) stage 3, GFR 30-59 ml/min N18.3    Hypertension, essential I10    A-fib ([facility] [address] I48.91    COPD (chronic obstructive pulmonary disease) ([facility] [address] J44.9    History of aortic dissection Z86.79    HLD (hyperlipidemia) E78.5    Elevated brain natriuretic peptide (BNP) level R79.89    Defibrillator discharge Z45.02    Cardiomyopathy, dilated (HCC) I42.0    Chronic a-fib (HCC) I48.2       Subjective:    Denies CP, SOB, palpitations, nausea    Review of systems:    Constitutional: denies fevers, chills, myalgias  Respiratory: denies SOB, cough  Cardiovascular: denies chest pain, palpitations  Gastrointestinal: denies nausea, vomiting, diarrhea      Vital signs/Intake and Output:  Visit Vitals    /74    Pulse 62    Temp 98.8 °F (37.1 °C)    Resp 12    Ht 5' 4\" (1.626 m)    Wt 65.8 kg (145 lb)    SpO2 100%    Breastfeeding No    BMI 24.89 kg/m2     Current Shift:  10/29 1901 - 10/30 0700  In: 100.2 [I.V.:100.2]  Out: 400 [Urine:400]  Last three shifts:  10/28 0701 - 10/29 1900  In: 371.6 [I.V.:371.6]  Out: 1775 [Urine:1775]    Exam:    General: Well developed, alert, NAD, OX3  Head/Neck: NCAT, supple, No masses, No lymphadenopathy  CVS:Regular rate and rhythm, no M/R/G, S1/S2 heard, no thrill  Lungs:Clear to auscultation bilaterally, no wheezes, rhonchi, or rales  Abdomen: Soft, Nontender, No distention, Normal Bowel sounds, No hepatomegaly  Extremities: No C/C/E, pulses palpable 2+  Skin:normal texture and turgor, no rashes, no lesions  Neuro:grossly normal , follows commands  Psych:appropriate                Labs: Results:       Chemistry Recent Labs      10/30/17   0405  10/29/17   0147  10/28/17   1933   GLU  88  118*  127*   NA  138  139  144   K  3.8  4.4  4.1   CL  103  107  110*   CO2  23  23  21   BUN  30*  27*  24*   CREA  1.82*  1.90*  1.87*   CA  8.8  8.7  8.5   AGAP  12  9  13   BUCR  16  14  13   AP   --    --   103   TP   --    --   7.5   ALB   --    --   3.4   GLOB   --    --   4.1*   AGRAT   --    --   0.8      CBC w/Diff Recent Labs      10/30/17   0405  10/29/17   0147  10/28/17   1933   WBC  5.3  7.5  6.8   RBC  4.47  4.12*  4.26   HGB  13.2  12.4  12.6   HCT  39.2  36.9  37.6   PLT  172  154  177   GRANS   --    --   55   LYMPH   --    --   33   EOS   --    --   1      Cardiac Enzymes Recent Labs      10/29/17   0738  10/29/17   0147   CPK  454*  335*   CKND1  0.9  1.1      Coagulation Recent Labs      10/30/17   0405  10/29/17   0147  10/28/17   1933   PTP  17.8*  15.8*  16.5*   INR  1.5*  1.3*  1.4*   APTT   --    --   33.7       Lipid Panel Lab Results   Component Value Date/Time    Cholesterol, total 124 06/09/2016 03:55 AM    HDL Cholesterol 34 06/09/2016 03:55 AM    LDL, calculated 72.8 06/09/2016 03:55 AM    VLDL, calculated 17.2 06/09/2016 03:55 AM    Triglyceride 86 06/09/2016 03:55 AM    CHOL/HDL Ratio 3.6 06/09/2016 03:55 AM      BNP No results for input(s): BNPP in the last 72 hours.    Liver Enzymes Recent Labs      10/28/17   1933   TP  7.5   ALB  3.4   AP  103   SGOT  49*      Thyroid Studies Lab Results   Component Value Date/Time    TSH 1.17 04/15/2017 04:45 AM        Procedures/imaging: see electronic medical records for all procedures/Xrays and details which were not copied into this note but were reviewed prior to creation of Deb Malcolm MD

## 2017-10-30 NOTE — PROGRESS NOTES
Readmission Risk Assessment:     High Risk and MSSP/[facility] ACO patients    RRAT Score:  21 or greater    Initial Assessment:chart reviewed cm spoke with patient after IDR's, pt admitted due to defibrillator discharge,pt states she lives at home with S.O and her daughter lives near, pt states she is independent with her care ,feels she will not need anything from cm,patient stated she plans to follow up with her cardiologist @ [address] when discharged. s     Emergency Contact: see chart     Pertinent Medical Hx:   See chart     PCP/Specialists: Toy     Community Services:      DME:         High Risk Care Transition Plan:  1. Evaluate for [place] or H2H, SNF, acute rehab, community care coordination of Resources. 2. Involve patient/caregiver in assessment, planning, education and implement of intervention. 3. CM daily patient care huddles/interdisciplinary rounds. 4. PCP/Specialist appointment within 48 hours of discharge unless otherwise specified. 5. Facilitate transportation and logistics for follow-up appointments. 6. Medication reconciliation [id number] [facility]  7. Discharge follow-up phone call within 2  4 days (NN, Melissa Voice, Nursing) CM follow up as assigned. 8. Formal handoff between hospital provider and post-acute provider to transition patient  9. Handoff to [facility] Nurse Navigator or PCP practice.

## 2017-10-30 NOTE — PROGRESS NOTES
0730- Bedside and Verbal shift change report given to Chan Brown RN (oncoming nurse) by Maria G Moralez RN (offgoing nurse). Report included the following information  SBAR, Kardex, [facility], Recent Results and Cardiac Rhythm Paced. 0800- Initial shift assessment complete. Patient denies pain/SOB. Will continue to monitor. 0900- Spoke with Dr Tessa Cazares about patient's potassium 3.8 with am labs, she will review chart and order replacement. Spoke with Dr Wendi Ocampo. Per MD Aceves to stop the amiodarone drip after giving PO amiodarone. Reported patient's HR in 40-50's overnight. Patient HR currently 61. Patients AICD to be interrogated. 1200- Reassessment complete. Patient denies pain/SOB. Will continue to monitor. 1600- Reassessment complete. Patient denies pain/SOB. Will continue to monitor. 1800- Spoke with Dr Wendi Ocampo, ok to transfer to [facility] from his end. Called and spoke with Dr Stacie Terry, she states she will review the chart and place transfer orders if she feels patient is ok to transfer out of ICU.     1939-Bedside and Verbal shift change report given to Frankey Folks, RN (oncoming nurse) by Chan Brown RN (offgoing nurse). Report included the following information SBAR, Kardex, Intake/Output, MAR, Recent Results and Cardiac Rhythm V Paced.

## 2017-10-30 NOTE — CONSULTS
Pulmonary and Critical Care Consult     Name: Annemarie Krishna MRN: [id number]   : 1955 Hospital: [facility] [place]   Date: 10/30/2017        Critical Care  Note: 10/30/2017 12:23 PM      A/  64 yof with   -- AICD discharge   -- Afib with RVR , currently rate controlled   -- Chronic systolic HF LVEF 22-55%. -- Subtherapuetic INR   -- Acute hypoxic respiratory insufficiency associated with above. -- COPD, without acute exacerbation. -- HTN  -- CKD    P/  -- Amiodarone drip as per cardiology with change to PO this AM   -- Metoprolol, Cozaar, Zocor and aldactone   -- ICD interogation pending.    -- Weight base lovenox. Continue coumadin   -- Continue Anoro Ellipta   -- Stable for transfer out of ICU from a CC standpoint. We are signing off at this point with previously noted recommendations. Thank you for the opportunity to assist in the patients care. Please do not hesitate to call back as needed. -- My assessment/plan was discussed with: Patient       Taylor PantojaDO        Subjective/Initial History:   I have reviewed the flowsheet and previous days notes. 64 yof with hx of vtach, chronic afib on coumadin, ICD placement in August and COPD that presented to the ED for SOB and feeling her defibrillator discharging. En route it fired two additional times. In the ED she was noted to be hypoxemic and pulse ox was 85% on arrival.  She has been taking her medications as prescribed and otherwise has been feeling well and was at her grandsons birthday party earlier in the day. She was admitted to medicine service for further management and placed on an amiodarone drip. The case was discussed with cardiology overnight and recommendations were made for placing her on metoprolol BID. As she was admitted to ICU. [facility] consult was obtained for assistance with management. On evaluation this AM she is stable. Awake and alert and NAD. She reports no complaints and states she is feeling well. .     Allergies   Allergen Reactions    Pcn [Penicillins] Unknown (comments)      Prior to Admission medications    Medication Sig Start Date End Date Taking? Authorizing Provider   amiodarone (CORDARONE) 200 mg tablet Take 1 Tab by mouth daily. 4/20/17   Pretty Armas MD   losartan (COZAAR) 25 mg tablet Take 1 Tab by mouth daily. 4/20/17   Pretty Armas MD   simvastatin (ZOCOR) 40 mg tablet Take 40 mg by mouth nightly. Historical Provider   umeclidinium-vilanterol (ANORO ELLIPTA) 62.5-25 mcg/actuation inhaler Take 1 Puff by inhalation daily. Tracy Leung MD   spironolactone (ALDACTONE) 25 mg tablet Take 1 Tab by mouth daily. 10/2/15   Shantal Clark MD   furosemide (LASIX) 20 mg tablet Take 1 Tab by mouth daily. 10/2/15   Shantal Clark MD   warfarin (COUMADIN) 5 mg tablet Take 1 Tab by mouth every evening. 10/2/15   Shantal Clark MD     Past Medical History:   Diagnosis Date    A-fib [facility])     Aortic dissection (HCC)     COPD (chronic obstructive pulmonary disease) ([facility] [address]     Heart failure ([facility] [address]     High cholesterol     Hypertension       Past Surgical History:   Procedure Laterality Date    CARDIAC SURG PROCEDURE UNLIST      HX PACEMAKER        Social History   Substance Use Topics    Smoking status: Former Smoker     Packs/day: 1.00     Years: 25.00     Types: Cigarettes     Quit date: 2/18/2004    Smokeless tobacco: Never Used    Alcohol use No      Comment: occasional alcohol ingestion      History reviewed. No pertinent family history. Review of Systems  Constitutional: no fever, chills and sweats. Respiratory: negative for cough, + shortness of breath, - wheezing. Cardiovascular: negative for chest pain, palpitations or syncope. No LE edema. + for AICD discharge  Gastrointestinal: negative for nausea, vomiting, melena and diarrhea. Genitourinary:negative for dysuria and hematuria. Integument/breast: negative for rash and skin lesions. Hematologic/lymphatic: negative for easy bruising and petechiae. Musculoskeletal:negative for myalgias and arthralgias. Neurological: negative for headaches, seizures and paresthesia. Behavioral/Psych: negative for anxiety and depression. Endocrine: negative for diabetic sxs including polyuria and polydipsia or temperature intolerance. Allergic/Immunologic: negative for urticaria and angioedema. A complete review of systems was performed with the pertinent negatives, as well as positive symptoms as stated above. All other systems reviewed and negative. Objective:   Vital Signs:    Visit Vitals    /74    Pulse 62    Temp 98.1 °F (36.7 °C)    Resp 12    Ht 5' 4\" (1.626 m)    Wt 67.4 kg (148 lb 9.4 oz)    SpO2 99%    Breastfeeding No    BMI 25.51 kg/m2       O2 Device: Nasal cannula   O2 Flow Rate (L/min): 2 l/min   Temp (24hrs), Av.2 °F (36.8 °C), Min:97.4 °F (36.3 °C), Max:98.8 °F (37.1 °C)       Intake/Output:   Last shift:         Last 3 shifts: 10/28 1901 - 10/30 0700  In: 538.6 [I.V.:538.6]  Out: 2175 [Urine:2175]    Intake/Output Summary (Last 24 hours) at 10/30/17 1223  Last data filed at 10/30/17 0400   Gross per 24 hour   Intake            267.2 ml   Output              650 ml   Net           -382.8 ml     Telemetry: Afib, rate controlled. Physical Exam  General: Awake alert NAD  Head: Normocephalic, without obvious abnormality, atraumatic. Eyes: Conjunctivae/corneas clear. PERRL, EOMs intact. Nose: Nares normal. Septum midline, Mucosa normal. No drainage or sinus tenderness. Throat: Lips, mucosa, and tongue normal.   Neck: Supple, symmetrical, trachea midline, no adenopathy, no thryroid enlargment, tenderness or nodules, no carotid bruit and no JVD  Lungs: Clear to auscultation bilaterally. Heart: Regular rate and rhythm, S1, S2 normal, no murmur, click, rub or gallop  Abdomen: Soft, non-tender. Bowel sounds normal. No masses. No organomegaly.   Extremities: Extremities normal, atraumatic, no cyanosis. Edema absent. Pulses: 2+ and symmetric all extremities. Skin: Skin color, texture, turgor normal. No rashes or lesions. Neurologic: Grossly nonfocal      Data:   MAR reviewed and pertinent medications noted or modified as needed             Labs:  Recent Labs      10/30/17   0405  10/29/17   0147  10/28/17   1933   WBC  5.3  7.5  6.8   HGB  13.2  12.4  12.6   HCT  39.2  36.9  37.6   PLT  172  154  177     Recent Labs      10/30/17   0405  10/29/17   0147  10/28/17   1933   NA  138  139  144   K  3.8  4.4  4.1   CL  103  107  110*   CO2  23  23  21   GLU  88  118*  127*   BUN  30*  27*  24*   CREA  1.82*  1.90*  1.87*   CA  8.8  8.7  8.5   MG   --    --   2.0   ALB   --    --   3.4   SGOT   --    --   49*   ALT   --    --   47   INR  1.5*  1.3*  1.4*     No results for input(s): PH, PCO2, PO2, HCO3, FIO2 in the last 72 hours.     Imaging:  I have personally reviewed the patients radiographs and have reviewed the reports:    Yuri Blue DO

## 2017-10-30 NOTE — PROGRESS NOTES
Pharmacy Dosing Services: Warfarin    Consult for Warfarin Dosing by Pharmacy by Dr. Carmen Gonsales provided for this 64 y.o.  female , for indication of Atrial Fibrillation. Day of Therapy : continuation from home regimen  Dose to achieve an INR goal of 2-3    Order entered for  Warfarin  5 mg to be given today at 18:00. Significant drug interactions: Amiodarone    LABS    PT/INR Lab Results   Component Value Date/Time    INR 1.5 10/30/2017 04:05 AM      Platelets Lab Results   Component Value Date/Time    PLATELET 892 06/20/3521 04:05 AM      H/H     Lab Results   Component Value Date/Time    HGB 13.2 10/30/2017 04:05 AM        Warfarin Administrations (last 168 hours)       Date/Time Action Medication Dose      10/29/17 1736 Given    warfarin (COUMADIN) tablet 5 mg 5 mg    10/28/17 2358 Given    warfarin (COUMADIN) tablet 5 mg 5 mg          Pharmacy to follow daily and will provide subsequent Warfarin dosing based on clinical status. Maicol Bang Pharm. D.   Clinical Pharmacist  [phone]

## 2017-10-30 NOTE — PROGRESS NOTES
conducted an initial consultation and Spiritual Assessment for Louann Chavez, who is a 64 y.o.,female. According to the patients chart Yazidi Affiliation is: [facility].     Patient stated that she is \"so-so , legally but not really. \"  Has daughter she trusts to make decisions. Also has boyfriend. Encouraged patient to complete an Advance Medical Directive. She will call if she decides to complete one. Positive outlook. The reason the Patient came to the hospital is:   Patient Active Problem List    Diagnosis Date Noted    Defibrillator discharge 10/28/2017    Cardiomyopathy, dilated ([facility] 10/28/2017    [facility] 10/28/2017    COPD (chronic obstructive pulmonary disease) ([facility] 04/15/2017    History of aortic dissection 04/15/2017    HLD (hyperlipidemia) 04/15/2017    Elevated brain natriuretic peptide (BNP) level 04/15/2017    A-fib ([facility] 06/09/2016    CKD (chronic kidney disease) stage 3, GFR 30-59 ml/min 10/01/2015    Hypertension, essential 10/01/2015    Acute on chronic renal failure ([facility] 09/27/2014    Acute on chronic systolic congestive heart failure ([facility] 02/19/2014        The  provided the following Interventions:  Initiated a relationship of care and support. Listened empathically. Provided information about Spiritual Care Services. Offered prayer and assurance of continued prayers on patients behalf. Chart reviewed. The following outcomes were achieved:   confirmed Patient's Yazidi Affiliation. Patient processed feelings about current hospitalization. Patient expressed gratitude for Spiritual Care visit. Assessment:  There are no significant spiritual or Zoroastrian issues which require further intervention at this time. Patient does not have any Zoroastrian or cultural needs that will affect patients preferences in health care. Plan:  Chaplains will continue to follow and will provide pastoral care as needed or requested.  recommends bedside caregivers page  on duty if patient shows signs of acute spiritual or emotional distress. [address] [facility], M.Div.   Board Certified   [phone] - Office

## 2017-10-30 NOTE — CONSULTS
[address] [place] [address] Rd    Name:  Anitra Gonzalez  MR#:  [id number]  :  1955  Account #:  [de-identified]  Date of Adm:  10/28/2017  Date of Consultation:  10/29/2017      REASON FOR CONSULTATION  1. Automatic implantable cardioverter-defibrillator discharge and  shocks. 2. Atrial fibrillation with rapid ventricular response. 3. Hypertension. 4. Shortness of breath. HISTORY OF PRESENT ILLNESS: This patient is a 64year-old  pleasant patient with a recent AICD placement in 2017 by Dr. Seth Brooks in [facility]. Her primary cardiologist is Dr. Jordan Lyons, and  according to the patient, she is pretty compliant to diet and medication,  but on presentation, her INR was subtherapeutic. The patient basically  came to the hospital because yesterday night she felt AICD discharge  with palpitations and shortness of breath and then she was brought to  the hospital.  In route to the hospital, she had another AICD discharge. As per the patient, she had 2 shock treatments, but she is not sure,  maybe 3 as well. Denied any fever, cough, or pleuritic chest pain. Denied any bleeding from anywhere. The patient denied any chest  pain. The patient has chronic shortness of breath, uses 2 pillows  during the night. Denied any orthopnea, PND, or ankle swelling also. PAST MEDICAL HISTORY  1. Hypertension. 2. Aortic dissection history. 3. Atrial fibrillation history. 4. AICD placement history. 5. Cardiomyopathy. 6. Severe congestive heart failure. 7. Dyslipidemia. 8. Hypertension. SURGICAL HISTORY: Significant for AICD placement. FAMILY HISTORY: Positive for CAD. SOCIAL HISTORY: Quit smoking a long time ago, in . Used to  smoke a pack per day. Denied any alcohol or drug abuse    REVIEW OF SYSTEMS: A 10-point review of systems completed.   Positive pertinent findings discussed in the history of present illness,  and the rest of the systems are normal.    [address] [place] [address] medications reviewed and discussed, which include:  1. Amiodarone 200 mg daily. 2. Losartan 25 mg daily. 3. Simvastatin 40 mg daily. 4. Furosemide 20 mg daily. 5. Warfarin 5 mg daily. 6. Spironolactone 25 mg daily. 7. Anoro Ellipta inhalation medicine. PHYSICAL EXAMINATION  GENERAL: At the time of the consultation, the patient is comfortable,  not in any distress, not using any accessory muscles of respiration. VITAL SIGNS: Temperature is 98.3, heart rate is 64, respirations 17,  blood pressure is 155/82, pulse oximetry is 98%. HEENT: Head is atraumatic, normocephalic. NECK: Supple. No JVD, no carotid bruits. HEART: S1 is variable because of atrial fibrillation. LUNGS: Bilateral air entry positive, no added sound. ABDOMEN: Soft, nontender, bowel sounds audible. EXTREMITIES: No edema. Pulses are palpable. NEUROLOGIC: No focal motor or sensory deficit. DERMATOLOGIC: No skin rash. MUSCULOSKELETAL: No obvious joint deformity. LABORATORY DATA: I do not have the EKG and in the computer at  this point. Hemoglobin is stable 12.6, platelet count is 963. Sodium  139, potassium 4.4, magnesium is 2.0. Creatinine is 1.87. First  troponin was 0.04, subsequently 0.12 and 0.13. CK-MB is 2.53, 3.8,  and 4.2. Echocardiogram has shown EF of 15%, and unable to assess  the PA pressure because of inadequate or insufficient TR velocity. ASSESSMENT AND PLAN  1. Automatic implantable cardioverter defibrillator shock treatment,  status post x2.  2. Atrial fibrillation with rapid ventricular response, no  on IV  amiodarone and a dose of metoprolol. 3. Severe cardiomyopathy. Ejection fraction 15% to 20%. 4. Chronic renal insufficiency. 5. Hypertension. RECOMMENDATIONS  1. I will check the AICD device interrogation to check if it is appropriate  versus inappropriate shock treatment. The patient is on home dose of  amiodarone 200 mg without any beta blocker.  I will add a small dose of  beta blocker 12.5 mg twice a day. We will titrate. There is no worry for  bradycardia because of backup ICD pacemaker. 2. Hypertension, uncontrolled on initial presentation. May need a small  dose of nitrates like isosorbide mononitrate, and the patient's INR is  subtherapeutic. Continue to bridge with the Lovenox. I am not sure the  compliance to medication in the setting of normal INR 1.3 on  presentation. Discussed in detail with the patient and RN of the  patient as well. Continue with IV amiodarone today. Would switch to  p.o. tomorrow. Will add small dose of metoprolol 12.5 mg twice daily  and isosorbide mononitrate.         MD Cl Tavares / Rhina.Jamar  D:  10/29/2017   15:53  T:  10/29/2017   21:06  Job #:  [id number]

## 2017-10-30 NOTE — PROGRESS NOTES
Cardiology Progress Note        Patient: Marika Dowling        Sex: female          DOA: 10/28/2017  YOB: 1955      Age:  64 y.o.        LOS:  LOS: 2 days   Assessment/Plan     Principal Problem:    Defibrillator discharge (10/28/2017)    Active Problems:    Acute on chronic systolic congestive heart failure ([address] (2/19/2014)      Overview: EF 25%. Acute on chronic renal failure (HCC) (9/27/2014)      CKD (chronic kidney disease) stage 3, GFR 30-59 ml/min (10/1/2015)      A-fib ([facility] (6/9/2016)      COPD (chronic obstructive pulmonary disease) ([facility] (4/15/2017)      History of aortic dissection (4/15/2017)      Cardiomyopathy, dilated ([facility]) (10/28/2017)      Chronic a-fib ([address] (10/28/2017)        Plan:  Feeling better  AICD shocks with Afib RVR  Continue with amiodarone and metoprolol, imdur  Back rate is 40/min  Continue with Lovenox bridging with warfarin  May be discharged tomorrow if stable and follow up with dr. Thor Graf                          Subjective:    cc: No cp    REVIEW OF SYSTEMS: NO CP, SOB, N,V,D, F,C  Objective:      Visit Vitals    /85    Pulse (!) 56    Temp 97.8 °F (36.6 °C)    Resp 14    Ht 5' 4\" (1.626 m)    Wt 67.4 kg (148 lb 9.4 oz)    SpO2 100%    Breastfeeding No    BMI 25.51 kg/m2     Body mass index is 25.51 kg/(m^2). Physical Exam:  General Appearance: Comfortable, not using accessory muscles of respiration. HEENT: SHANKAR. HEAD: Atraumatic  NECK: No JVD, no thyroidomeglay. LUNGS: Clear bilaterally. HEART: S1 varibale+S2 audible, no murmur, no pericardial rub. ABD: Non-tender, BS Audible    EXT: No edema, and no cysnosis. VASCULAR EXAM: Pulses are intact. PSYCHIATRIC EXAM: Mood is appropriate. MUSCULOSKELETAL: Grossly no joint deformity.     Medication:  Current Facility-Administered Medications   Medication Dose Route Frequency    furosemide (LASIX) tablet 40 mg  40 mg Oral DAILY    potassium chloride (K-DUR, KLOR-CON) SR tablet 20 mEq  20 mEq Oral DAILY    warfarin (COUMADIN) tablet 5 mg  5 mg Oral ONCE    losartan (COZAAR) tablet 25 mg  25 mg Oral DAILY    simvastatin (ZOCOR) tablet 40 mg  40 mg Oral QHS    spironolactone (ALDACTONE) tablet 25 mg  25 mg Oral DAILY    umeclidinium-vilanterol (ANORO ELLIPTA) 62.5 mcg- 25 mcg/inhalation  (Patient Supplied)  1 Puff Inhalation DAILY    enoxaparin (LOVENOX) injection 70 mg  1 mg/kg SubCUTAneous Q24H    acetaminophen (TYLENOL) tablet 650 mg  650 mg Oral Q6H PRN    amiodarone (CORDARONE) tablet 200 mg  200 mg Oral DAILY    metoprolol tartrate (LOPRESSOR) tablet 12.5 mg  12.5 mg Oral Q12H    isosorbide mononitrate ER (IMDUR) tablet 30 mg  30 mg Oral DAILY    amiodarone (NEXTERONE) 360 mg in dextrose 200 mL (1.8 mg/mL) infusion  0.5-1 mg/min IntraVENous TITRATE    albuterol-ipratropium (DUO-NEB) 2.5 MG-0.5 MG/3 ML  3 mL Nebulization Q4H PRN    famotidine (PEPCID) tablet 20 mg  20 mg Oral DAILY    Warfarin: Pharmacy to Dose  1 Each Other Rx Dosing/Monitoring               Lab/Data Reviewed: All lab results for the last 24 hours reviewed.      Recent Labs      10/30/17   0405  10/29/17   0147  10/28/17   1933   WBC  5.3  7.5  6.8   HGB  13.2  12.4  12.6   HCT  39.2  36.9  37.6   PLT  172  154  177     Recent Labs      10/30/17   0405  10/29/17   0147  10/28/17   1933   NA  138  139  144   K  3.8  4.4  4.1   CL  103  107  110*   CO2  23  23  21   GLU  88  118*  127*   BUN  30*  27*  24*   CREA  1.82*  1.90*  1.87*   CA  8.8  8.7  8.5       Signed By: Fidelina Haines MD     October 30, 2017

## 2017-10-31 VITALS
DIASTOLIC BLOOD PRESSURE: 69 MMHG | HEART RATE: 58 BPM | RESPIRATION RATE: 13 BRPM | SYSTOLIC BLOOD PRESSURE: 145 MMHG | TEMPERATURE: 98.2 F | HEIGHT: 64 IN | OXYGEN SATURATION: 93 % | BODY MASS INDEX: 25.37 KG/M2 | WEIGHT: 148.59 LBS

## 2017-10-31 LAB
CK MB CFR SERPL CALC: 0.4 % (ref 0–4)
CK MB SERPL-MCNC: 2.1 NG/ML (ref 5–25)
CK SERPL-CCNC: 503 U/L (ref 26–192)
INR PPP: 1.6 (ref 0.8–1.2)
PROTHROMBIN TIME: 18.2 SEC (ref 11.5–15.2)
TROPONIN I SERPL-MCNC: 0.03 NG/ML (ref 0–0.06)

## 2017-10-31 PROCEDURE — 74011250637 HC RX REV CODE- 250/637: Performed by: INTERNAL MEDICINE

## 2017-10-31 PROCEDURE — 77010033678 HC OXYGEN DAILY

## 2017-10-31 PROCEDURE — 82550 ASSAY OF CK (CPK): CPT | Performed by: INTERNAL MEDICINE

## 2017-10-31 PROCEDURE — 74011250637 HC RX REV CODE- 250/637: Performed by: HOSPITALIST

## 2017-10-31 PROCEDURE — 36415 COLL VENOUS BLD VENIPUNCTURE: CPT | Performed by: HOSPITALIST

## 2017-10-31 PROCEDURE — 85610 PROTHROMBIN TIME: CPT | Performed by: HOSPITALIST

## 2017-10-31 PROCEDURE — 74011250636 HC RX REV CODE- 250/636: Performed by: HOSPITALIST

## 2017-10-31 RX ORDER — METOPROLOL TARTRATE 25 MG/1
12.5 TABLET, FILM COATED ORAL EVERY 12 HOURS
Qty: 60 TAB | Refills: 0 | Status: SHIPPED | OUTPATIENT
Start: 2017-10-31

## 2017-10-31 RX ORDER — METOPROLOL TARTRATE 25 MG/1
12.5 TABLET, FILM COATED ORAL EVERY 12 HOURS
Qty: 60 TAB | Refills: 0 | Status: SHIPPED | OUTPATIENT
Start: 2017-10-31 | End: 2017-10-31

## 2017-10-31 RX ORDER — ISOSORBIDE MONONITRATE 30 MG/1
30 TABLET, EXTENDED RELEASE ORAL DAILY
Qty: 30 TAB | Refills: 0 | Status: SHIPPED | OUTPATIENT
Start: 2017-10-31 | End: 2017-10-31

## 2017-10-31 RX ORDER — ISOSORBIDE MONONITRATE 30 MG/1
30 TABLET, EXTENDED RELEASE ORAL DAILY
Qty: 30 TAB | Refills: 0 | Status: SHIPPED | OUTPATIENT
Start: 2017-10-31

## 2017-10-31 RX ADMIN — ENOXAPARIN SODIUM 70 MG: 80 INJECTION SUBCUTANEOUS at 06:56

## 2017-10-31 RX ADMIN — METOPROLOL TARTRATE 12.5 MG: 25 TABLET ORAL at 08:44

## 2017-10-31 RX ADMIN — AMIODARONE HYDROCHLORIDE 200 MG: 200 TABLET ORAL at 08:44

## 2017-10-31 RX ADMIN — POTASSIUM CHLORIDE 20 MEQ: 20 TABLET, EXTENDED RELEASE ORAL at 08:44

## 2017-10-31 RX ADMIN — SPIRONOLACTONE 25 MG: 25 TABLET, FILM COATED ORAL at 08:43

## 2017-10-31 RX ADMIN — FAMOTIDINE 20 MG: 20 TABLET ORAL at 08:43

## 2017-10-31 RX ADMIN — LOSARTAN POTASSIUM 25 MG: 25 TABLET ORAL at 08:44

## 2017-10-31 RX ADMIN — FUROSEMIDE 40 MG: 40 TABLET ORAL at 08:44

## 2017-10-31 RX ADMIN — ISOSORBIDE MONONITRATE 30 MG: 30 TABLET, EXTENDED RELEASE ORAL at 08:43

## 2017-10-31 NOTE — PROGRESS NOTES
Patient discharged cm offered [facility] for CHF and COPD pt declined ,denies any cm needs at this time,pt 's friend will be providing transportation home. Care Management Interventions  PCP Verified by CM: Yes  Palliative Care Criteria Met (RRAT>21 & CHF Dx)?: No  Mode of Transport at Discharge: Self  Transition of Care Consult (CM Consult): Other  Discharge Durable Medical Equipment: No  Health Maintenance Reviewed: Yes  Physical Therapy Consult: Yes  Occupational Therapy Consult: No  Speech Therapy Consult: No  Current Support Network:  Other  Confirm Follow Up Transport: Self  Plan discussed with Pt/Family/Caregiver: Yes  Freedom of Choice Offered:  (n/a)  Discharge Location  Discharge Placement: Home

## 2017-10-31 NOTE — DISCHARGE INSTRUCTIONS
Learning About ACE Inhibitors for Heart Failure  Introduction    ACE (angiotensin-converting enzyme) inhibitors block an enzyme that makes blood vessels narrow. As a result, the blood vessels relax and widen. This lowers blood pressure. These medicines also put more water and salt into the urine. This also lowers blood pressure. In heart failure, your heart does not pump as much blood as your body needs. These medicines:  · Make it easier for your heart to pump. · Help reduce symptoms. · Make a heart attack or stroke less likely. Examples  These medicines include:  · Benazepril (Lotensin). · Lisinopril (Prinivil, Zestril). · Ramipril (Altace). This is not a complete list.  Possible side effects  Side effects may include:  · Cough. · Low blood pressure. You may feel dizzy and weak. · High potassium levels. · An allergic reaction. You may have other side effects or reactions not listed here. Check the information that comes with your medicine. What to know about taking this medicine  · ACE inhibitors:  ¨ Are often used to treat heart failure. They may be the only medicine used if your only symptoms are feeling tired and mildly short of breath with no fluid buildup (edema). But in most other cases, they are used with other medicines. ¨ Can cause a dry cough. If the cough is bad, talk to your doctor. You may need to try a different medicine. ¨ Can relieve symptoms, improve how you feel, and make it less likely you will need to stay in a hospital. And they can reduce the risk of early death. ¨ Can cause an allergic reaction of the skin. Symptoms may range from mild swelling to painful welts. It is rare to have severe swelling that makes it hard to breathe. · Take your medicines exactly as prescribed. Call your doctor if you think you are having a problem with your medicine. · Check with your doctor or pharmacist before you use any other medicines. This includes over-the-counter medicines.  Make sure your doctor knows all of the medicines, vitamins, herbal products, and supplements you take. Taking some medicines together can cause problems. · You should not take an ACE inhibitor if you are pregnant or planning to become pregnant. · You may need regular blood tests. Where can you learn more? Go to http://vicki-chandrika.info/. Enter N843 in the search box to learn more about \"Learning About ACE Inhibitors for Heart Failure. \"  Current as of: September 21, 2016  Content Version: 11.4  © 0713-6522 [employer]. Care instructions adapted under license by [employer] (which disclaims liability or warranty for this information). If you have questions about a medical condition or this instruction, always ask your healthcare professional. [address] any warranty or liability for your use of this information. Deciding About Stopping Dialysis  Deciding About Stopping Dialysis    What should you know about stopping dialysis? Dialysis is a process that filters waste from your blood when your kidneys can no longer do the job. When you have kidney failure, you may have either hemodialysis or peritoneal dialysis. Most people die within weeks of stopping dialysis. If you decide to stop, health professionals can help you have the highest quality of life possible. These are people who give end-of-life care. This may be done through hospice care. Hospice care offers the chance to think about personal goals. It can relieve pain. And it can help take care of your emotional and spiritual needs. No matter what you decide, take the time to let others know your wishes about your care. You can use a legal document to make sure that you get the medical treatment you want. This is called an advance directive. What are crawford points about this decision? · You may feel better on dialysis than you did before you started treatment.  But you may have side effects, such as appetite changes. Or you may start to have other problems. If this is the case, you may feel that continuing treatment is too hard. · If dialysis lets you do the activities you enjoyed before, you may feel that it hasn't changed your daily life that much. You may feel this way even if you can't do all of your old activities. Or you may feel that your quality of life on dialysis is not good. · Your diagnosis of kidney failure may force you to rethink your goals for your future. If you feel that your life has been rewarding and that you have met many goals, you may feel okay about stopping dialysis. But if you have goals you have not yet met, you may want to continue. · Most people die within weeks of stopping dialysis. If you choose to stop, you should be ready to put your personal, financial, and legal affairs in order. You may want to continue dialysis if you aren't ready to face these issues. · Clearly state your wishes to your family. If you decide to stop treatment, will your family understand your reasons? Do they support your decision to continue (or stop) treatment? Why might you choose to stop dialysis? If you have been getting regular dialysis, and if a kidney transplant is not an option for you, stopping dialysis may:  · Give you more time each day to spend with friends and family instead of going to treatments. · Allow you to eat and drink what you want in the time you have left. You may welcome this if your diet has been limited while you have been on dialysis. · Relieve worries about paying for dialysis, if you have been concerned. · Reduce problems that come with regular dialysis. These problems may include infection or clotting of the access. · Encourage you to talk with your loved ones about end-of-life goals and wishes. Why might you choose to stay on dialysis? Staying on dialysis may:  · Allow you to live longer and have more time with your family and friends.   · Give you time to complete your goals and projects. · Help you feel better for as long as possible. You may feel better physically on dialysis than you did before dialysis. · Allow you to do your normal activities. · Give you more time to put your affairs in order. Your decision  Thinking about the facts and your feelings can help you make a decision that is right for you. Be sure you understand the benefits and risks of your options, and think about what else you need to do before you make the decision. Where can you learn more? Go to http://vicki-chandrika.info/. Enter P138 in the search box to learn more about \"Deciding About Stopping Dialysis. \"  Current as of: May 12, 2017  Content Version: 11.4  © 6156-8880 [employer]. Care instructions adapted under license by [employer] (which disclaims liability or warranty for this information). If you have questions about a medical condition or this instruction, always ask your healthcare professional. [place] [address] any warranty or liability for your use of this information. Lab Results   Component Value Date/Time    Hemoglobin A1c 6.3 04/17/2017 04:48 AM       This lab test reflects that your blood sugar has been slightly elevated in the recent the past and should be evaluated by your primary care provider. An A1C of 5.7-6.4% meets the criteria for pre-diabetes; an A1C of 6.5% or higher meets the criteria for diabetes. It is important to follow up with your provider on a routine basis to continue to evaluate your blood sugar and discuss any necessary treatment.

## 2017-10-31 NOTE — ROUTINE PROCESS
Bedside, Verbal and Written shift change report given to EDDI Swan (oncoming nurse) by MONTRELL Nails (offgoing nurse). Report included the following information SBAR, Kardex, Intake/Output and Recent Results. Assumed care of pt at this time, pt remains aox4 following commands, pt denies pain sob at this time. Up at tessie.     0900  Scheduled med's given. Pt tolerated well. Sitting up at bedside eating breakfast.      0950  Pt to be discharged home today. 1145  Discharge instructions given to pt, IVs DCd. Pt assisted via wheel chair to car.

## 2017-10-31 NOTE — ROUTINE PROCESS
1938 Bedside verbal shift change report received from Malka De Leon RN(offgoing nurse) given to Chauncey Lo RN(oncoming nurse). Report included SBAR,MAR,KARDEX,TELE,I/O    0745 Bedside verbal shift change report given to Eloisa Moore RN(oncoming nurse) by Chauncey Lo RN(offgoing nurse).  Report consist of SBAR,MAR,KARDEX,TELE,I/O

## 2017-10-31 NOTE — PROGRESS NOTES
PT order acknowledged and chart reviewed. Attempted to see pt at 1030. Spoke with pt. She is preparing to discharge. Pt declined mobility at this time due to discharging soon. She reports she is independent at home without AD. Spoke with pts nurse, David Cloud, he reports she has been getting up independently and transferring to/from bedside commode with no assistance, steady gait.

## 2017-10-31 NOTE — DISCHARGE SUMMARY
[facility] SUMMARY    Name:  Wendi Roa  MR#:  [id number]  :  1955  Account #:  [de-identified]  Date of Adm:  10/28/2017  Date of Discharge:  10/31/2017      DIAGNOSES AT DISCHARGE:  1. Defibrillator firing, chronic atrial fibrillation, COPD, chronic systolic  congestive heart failure and cardiomyopathy. 2. History of aortic dissection. 3. Chronic obstructive pulmonary disease. 4. Chronic kidney disease stage 3. HOSPITAL SUMMARY: This is a delightful 70-year-old   American female with a known EF prior to admission of 10% to 15%,  with recheck here it is 15-20% per echo, but she came in because her  defibrillator went off at least 3 times before getting to the emergency  room. It felt like a punch to the chest wall. She had been watching TV  when the first one occurred. She came in with her significant other to  the emergency room. She is chronically on Coumadin for history of  atrial fibrillation. She had the defibrillator placed this past August. She  had three discharges of it occur before she came to the emergency  room, it felt like a punch to the chest. It started while she was watching  TV. She has a known EF of 10% to 15% before coming to the hospital,  but upon evaluation here is 15-20% per echo. She was admitted to the  intensive care unit and seen in consultation by Cardiology. She was  started on low-dose metoprolol here and had an interrogation of her  defibrillator. It appears as if she has been having firing stimulated by  atrial fibrillation. She has been intermittently on amiodarone drip but  is also on oral amiodarone chronically. She has been on Lovenox here  as her Coumadin level was subtherapeutic when she came in. But  overall clinically she has done well, she is actually doing much better,  and Cardiology saw her last night and recommended she could be  discharged home today.  She will continue on amiodarone,  metoprolol, and Imdur. At this point in time, her Coumadin level is up to  1.6. It was 1.3 on the 29th. That is doing better. Her last chemistry on  the 30th was normal except her creatinine at 1.82, which is consistent  with her chronic kidney disease stage 3, and her CBC was within  normal limits as well. No signs of infection otherwise or fulminant heart  failure here. She has been stable with her vital signs, blood pressure is  159/75, pulse 62, temperature 98.2, respiratory rate 13. SaO2 94% on  room air. She is awake and oriented, alert x3 this morning. Also I did  wake her from sleep. Lungs are clear bilaterally. Cardiac exam,  irregular rhythm, regular rate. Abdomen is soft, nontender. Lower  extremities, no clubbing, cyanosis, or edema. Yesterday care  management visited with her to discuss transition of care needs. I  spoke with her significant other yesterday and advised that she would  be discharged today if she was doing well. PLAN: Is to discharge her home. DISCHARGE MEDICATIONS:  1. Amiodarone 200 mg daily. 2. Lasix 20 mg daily. 3. Imdur 30 mg daily. 4. Cozaar 25 mg daily. 5. Lopressor 12.5 mg twice daily. 6. Zocor 40 mg at night. 7. Aldactone 25 mg daily. 8. Ellipta 1 puff inhalation daily. 9. Coumadin 5 mg every evening. She is stable for release from the hospital today, to followup with Dr. Alix Bustillos in 3-4 days in the office [facility]. Continue  the beta blocker for now. Of note, her cardiac markers were  reasonably unremarkable. Initial troponin 0.04, it peaked at 0.13, but  no notable chest pain or symptoms of acute coronary syndrome  otherwise. Thirty-five minutes discharge time.         MD RAVINDRA Haddad / Norma Handley  D:  10/31/2017   04:09  T:  10/31/2017   16:22  Job #:  [id number]

## 2017-11-05 LAB
ATRIAL RATE: 138 BPM
ATRIAL RATE: 153 BPM
ATRIAL RATE: 67 BPM
ATRIAL RATE: 74 BPM
CALCULATED P AXIS, ECG09: 66 DEGREES
CALCULATED P AXIS, ECG09: 76 DEGREES
CALCULATED R AXIS, ECG10: -90 DEGREES
CALCULATED R AXIS, ECG10: -91 DEGREES
CALCULATED T AXIS, ECG11: 108 DEGREES
CALCULATED T AXIS, ECG11: 114 DEGREES
CALCULATED T AXIS, ECG11: 20 DEGREES
CALCULATED T AXIS, ECG11: 68 DEGREES
DIAGNOSIS, 93000: NORMAL
P-R INTERVAL, ECG05: 102 MS
P-R INTERVAL, ECG05: 110 MS
Q-T INTERVAL, ECG07: 336 MS
Q-T INTERVAL, ECG07: 398 MS
Q-T INTERVAL, ECG07: 510 MS
Q-T INTERVAL, ECG07: 522 MS
QRS DURATION, ECG06: 166 MS
QRS DURATION, ECG06: 168 MS
QRS DURATION, ECG06: 168 MS
QRS DURATION, ECG06: 178 MS
QTC CALCULATION (BEZET), ECG08: 541 MS
QTC CALCULATION (BEZET), ECG08: 551 MS
QTC CALCULATION (BEZET), ECG08: 566 MS
QTC CALCULATION (BEZET), ECG08: 574 MS
VENTRICULAR RATE, ECG03: 125 BPM
VENTRICULAR RATE, ECG03: 156 BPM
VENTRICULAR RATE, ECG03: 67 BPM
VENTRICULAR RATE, ECG03: 74 BPM

## 2018-02-14 NOTE — ROUTINE PROCESS
TRANSFER - OUT REPORT:    Verbal report given to [facility] on Rey Sutherland  being transferred to [facility] for routine progression of care       Report consisted of patients Situation, Background, Assessment and   Recommendations(SBAR). Information from the following report(s) SBAR, ED Summary and MAR was reviewed with the receiving nurse. Lines:   Peripheral IV 10/28/17 Left Forearm (Active)   Site Assessment Clean, dry, & intact 10/28/2017  7:17 PM   Phlebitis Assessment 0 10/28/2017  7:17 PM   Infiltration Assessment 0 10/28/2017  7:17 PM   Dressing Status Clean, dry, & intact 10/28/2017  7:17 PM   Hub Color/Line Status Patent 10/28/2017  7:17 PM       Peripheral IV 10/28/17 Right Forearm (Active)   Site Assessment Clean, dry, & intact 10/28/2017  7:30 PM   Phlebitis Assessment 0 10/28/2017  7:30 PM   Infiltration Assessment 0 10/28/2017  7:30 PM   Dressing Status Clean, dry, & intact 10/28/2017  7:30 PM   Hub Color/Line Status Patent 10/28/2017  7:30 PM        Opportunity for questions and clarification was provided.       Patient transported with:   Monitor  O2 @ 2 liters  Registered Nurse  [facility] 22

## 2021-10-14 NOTE — ED TRIAGE NOTES
Presents with EMS with complaints of sudden feeling of hotness and SOB. Pt reported to be tachycardic by EMS. Placed on monitor in room 1 and found to be in [facility]. 2

## 2021-10-21 NOTE — ED NOTES
Remains on CCM with noted continued V-tach with rate as documented. Patient continues to deny any c/o pain at this time. Patient remains on O2 @ 2 liters via NC with no s/s distress noted. Family at bedside. Spontaneous, unlabored and symmetrical

## 2023-02-08 NOTE — PROGRESS NOTES
Cardiology Progress Note        Patient: Roma Rodriguez        Sex: female          DOA: 4/15/2017  YOB: 1955      Age:  64 y.o.        LOS:  LOS: 3 days    Patient seen and examined. Chart reviewed. Assessment/Plan     Patient Active Problem List   Diagnosis Code    Acute respiratory failure ([facility]) J96.00    Pneumonia, organism unspecified J18.9    Acute on chronic systolic congestive heart failure ([facility]) I50.23    Respiratory failure, acute ([facility] [address] J96.00         Acute on chronic renal failure ([facility]) N17.9, N18.9    Pneumonia J18.9    New onset a-fib ([facility]) I48.91    CKD (chronic kidney disease) stage 3, GFR 30-59 ml/min H82.7    Systolic CHF, acute ([facility]) I50.21    Hypertension, essential I10    A-fib ([facility] [address] I48.91         Atrial fibrillation with RVR ([facility]) I48.91    Acute pulmonary edema ([facility]) J81.0         Respiratory distress R06.00    COPD (chronic obstructive pulmonary disease) ([facility]) J44.9    History of aortic dissection Z86.79    HLD (hyperlipidemia) E78.5    Elevated brain natriuretic peptide (BNP) level R79.89     Persistent atrial fibrillation s/p cardioversion    Hypotension most likely medication induced. Coreg was discontinued yesterday. Plan:  Continue Losartan. Start Toprol XL 25 mg PO BID. Continue Coumadin as per PT/INR. Amiodarone 400 mg PO BID for 3 days and then 200 mg PO once a day. Continue management as per hospital medicine. Subjective:    cc:  Denies any chest pain or shortness of breath       REVIEW OF SYSTEMS:     General: No fevers or chills. Cardiovascular: No chest pain,No palpitations, No orthopnea, No PND, No leg swelling, No claudication  Pulmonary: No shortness of breath.    Gastrointestinal: No nausea, vomiting, bleeding  Neurology: No Dizziness    Objective:      Visit Vitals    /56    Pulse (!) 54    Temp 97.6 °F (36.4 °C)    Resp 21    Ht 5' 5\" (1.651 m)    Wt 67.1 kg (147 lb 14.9 Patient granddaughter called in requesting return call back in reference to patient jaycee a lung cancer screening   Please return call    Spoke with pt's grand daughter. She would like to schedule pt for CT lung cancer screen. Orders placed per request. Advised Berna that imaging will need to be completed prior to 78th birthday of pt. Also advised her to check with insurance on coverage. Berna verbalized understanding and appreciative of call.   oz)    SpO2 100%    BMI 24.62 kg/m2     Body mass index is 24.62 kg/(m^2). Physical Exam:  General Appearance: Comfortable, not using accessory muscles of respiration. HEENT: SHANKAR. HEAD: Atraumatic  NECK: No JVD, no thyroidomeglay. CAROTIDS: no bruit  LUNGS: Clear bilaterally. HEART: S1+S2 audible, no murmur, no pericardial rub. ABD: Non-tender, BS Audible    EXT: No edema, and no cyanosis. VASCULAR EXAM: Pulses are intact. PSYCHIATRIC EXAM: Mood is appropriate. MUSCULOSKELETAL: Grossly no joint deformity. NEUROLOGICAL: AAO times No motor and sensory deficit. Medication:  Current Facility-Administered Medications   Medication Dose Route Frequency    losartan (COZAAR) tablet 25 mg  25 mg Oral DAILY    [START ON 4/19/2017] furosemide (LASIX) tablet 20 mg  20 mg Oral DAILY    insulin lispro (HUMALOG) injection   SubCUTAneous AC&HS    glucose chewable tablet 16 g  4 Tab Oral PRN    glucagon (GLUCAGEN) injection 1 mg  1 mg IntraMUSCular PRN    dextrose (D50W) injection syrg 12.5-25 g  25-50 mL IntraVENous PRN    metoprolol succinate (TOPROL-XL) XL tablet 25 mg  25 mg Oral DAILY    amiodarone (CORDARONE) tablet 400 mg  400 mg Oral Q8H    sodium chloride (NS) flush 5-10 mL  5-10 mL IntraVENous Q8H    sodium chloride (NS) flush 5-10 mL  5-10 mL IntraVENous PRN    hyaluronidase, human recomb.  (HYLENEX) 75 Units in sodium chloride 0.9 % 4.5 mL syringe   SubCUTAneous PRN    Warfarin- Rx to Dose & Monitor  1 Each Other Rx Dosing/Monitoring               Lab/Data Reviewed:       Recent Labs      04/18/17   0500  04/16/17   0430   WBC   --   6.6   HGB  12.7  12.6   HCT  37.9  37.3   PLT  185  182     Recent Labs      04/18/17   0500  04/16/17   0430   NA  139  140   K  4.0  3.6   CL  103  106   CO2  26  22   GLU  93  108*   BUN  37*  24*   CREA  2.14*  1.77*   CA  9.0  8.7       Signed By: Maria Luz Berkowitz MD     April 18, 2017 [FreeTextEntry1] : s/p lap  otis now with unremitting postoperative pain. I discussed with Mrs. Alejandre the possibility of a dropped stone, residual infection, hernia and possible nonsurgical etiologies like costochondritis. Her pain could also be due to sleeve complications such as ulceration. \par -Daily PPI recommended, she is to get OTC pepcid\par -CT abd/pelv w contrast \par -follow up after CT scan\par -to present to the ER if she experiences fever and/or worsening pain. \par \par I spent 26min reviewing data, images and information. Greater than 50% of my time was spent in face to face discussion regarding wound healing, postoperative diet and possibility etiologies of her pain. \par \par Matt Jones MD\par Acute Care Surgery\par \par
